# Patient Record
Sex: FEMALE | Race: WHITE | URBAN - METROPOLITAN AREA
[De-identification: names, ages, dates, MRNs, and addresses within clinical notes are randomized per-mention and may not be internally consistent; named-entity substitution may affect disease eponyms.]

---

## 2017-11-10 ENCOUNTER — OFFICE VISIT (OUTPATIENT)
Dept: FAMILY MEDICINE | Facility: CLINIC | Age: 42
End: 2017-11-10
Payer: MEDICAID

## 2017-11-10 ENCOUNTER — HEALTH MAINTENANCE LETTER (OUTPATIENT)
Age: 42
End: 2017-11-10

## 2017-11-10 ENCOUNTER — TELEPHONE (OUTPATIENT)
Dept: DERMATOLOGY | Facility: CLINIC | Age: 42
End: 2017-11-10

## 2017-11-10 VITALS
HEART RATE: 82 BPM | TEMPERATURE: 98 F | BODY MASS INDEX: 18.23 KG/M2 | SYSTOLIC BLOOD PRESSURE: 109 MMHG | WEIGHT: 102.9 LBS | DIASTOLIC BLOOD PRESSURE: 82 MMHG | HEIGHT: 63 IN

## 2017-11-10 DIAGNOSIS — G89.4 CHRONIC PAIN SYNDROME: Primary | ICD-10-CM

## 2017-11-10 DIAGNOSIS — L88 PYODERMA GANGRENOSA (H): ICD-10-CM

## 2017-11-10 DIAGNOSIS — T88.1XXA GUILLAIN-BARRE SYNDROME FOLLOWING VACCINATION (H): ICD-10-CM

## 2017-11-10 DIAGNOSIS — K52.9 COLITIS: ICD-10-CM

## 2017-11-10 DIAGNOSIS — F41.9 ANXIETY: ICD-10-CM

## 2017-11-10 DIAGNOSIS — G61.0 GUILLAIN-BARRE SYNDROME FOLLOWING VACCINATION (H): ICD-10-CM

## 2017-11-10 DIAGNOSIS — Z71.89 ENCOUNTER FOR OSTOMY CARE EDUCATION: ICD-10-CM

## 2017-11-10 DIAGNOSIS — G47.00 INSOMNIA, UNSPECIFIED TYPE: ICD-10-CM

## 2017-11-10 PROCEDURE — 99203 OFFICE O/P NEW LOW 30 MIN: CPT | Performed by: NURSE PRACTITIONER

## 2017-11-10 RX ORDER — TRAZODONE HYDROCHLORIDE 50 MG/1
150 TABLET, FILM COATED ORAL AT BEDTIME
Qty: 90 TABLET | Refills: 2 | Status: SHIPPED | OUTPATIENT
Start: 2017-11-10 | End: 2018-02-08

## 2017-11-10 RX ORDER — GABAPENTIN 300 MG/1
1 CAPSULE ORAL
COMMUNITY
Start: 2017-05-08 | End: 2017-11-10

## 2017-11-10 RX ORDER — GABAPENTIN 600 MG/1
1200 TABLET ORAL 3 TIMES DAILY
Qty: 180 TABLET | Refills: 1 | Status: SHIPPED | OUTPATIENT
Start: 2017-11-10 | End: 2018-01-31

## 2017-11-10 RX ORDER — TRAMADOL HYDROCHLORIDE 50 MG/1
50 TABLET ORAL EVERY 6 HOURS PRN
Qty: 90 TABLET | Refills: 0 | Status: SHIPPED | OUTPATIENT
Start: 2017-11-10 | End: 2017-12-18

## 2017-11-10 RX ORDER — SERTRALINE HYDROCHLORIDE 100 MG/1
100 TABLET, FILM COATED ORAL DAILY
Qty: 30 TABLET | Refills: 2 | Status: SHIPPED | OUTPATIENT
Start: 2017-11-10 | End: 2018-02-05

## 2017-11-10 NOTE — TELEPHONE ENCOUNTER
Message left to return call. I reviewed patient's chart and I think she should be worked in- Dr. Belle has a hold right now on Tuesday 11-14-17 at 2:30 pm, I would offer to patient if she calls back (put her in that spot if it is available. Penny Souza RN

## 2017-11-10 NOTE — PROGRESS NOTES
SUBJECTIVE:   Yuni Lam is a 42 year old female who presents to clinic today for the following health issues:would like to establish care. The patient has complicated health history. 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 She was following provider in [place], but recently moved to [place] to live with her mother as she could not afford living on her own anymore due to financial problems, she is currently unemployed. She was following wound clinic in [place], she was diagnosed with pyoderma gangrenosa, was treated with multiple antibiotics, topical steroids and also received steroid injections around stoma site which patient states made her pain and ulcers worse. She needs to establish care with new GI doctor and wound clinic.   For chronic pain she is currently taking Gabapentin 1200 mg 3 times daily and Tramadol 50 mg 3 times daily as needed. I did review [facility] medication monitoring program and there were no significant red flags.She received 6 opioid prescriptions for small amounts (30-40 tablets) since April 2017. She is looking for PCP who can help her with her chronic pain. She reports that she is currently taking Tramadol 50 mg 3 times daily and she does not take more than 3 tablets per day.   The patient also has history of anxiety, she was treated with Paxil in the past and states it did not help to control her symptoms, she is currently on Zoloft 100 mg daily and states her anxiety currently is under control.     Problem list and histories reviewed & adjusted, as indicated.  Additional history: as documented    Labs reviewed in EPIC    Reviewed and updated as needed this visit by clinical staff  Tobacco  Allergies  Meds  Med Hx  Surg Hx  Fam Hx  Soc Hx      Reviewed and updated as needed this visit by Provider         ROS:  Constitutional, HEENT, cardiovascular, pulmonary, gi and gu systems are negative, except as otherwise  "noted.      OBJECTIVE:   /82  Pulse 82  Temp 98  F (36.7  C) (Tympanic)  Ht 5' 3.25\" (1.607 m)  Wt 102 lb 14.4 oz (46.7 kg)  BMI 18.08 kg/m2  Body mass index is 18.08 kg/(m^2).  GENERAL: healthy, alert and no distress  RESP: lungs clear to auscultation - no rales, rhonchi or wheezes  CV: regular rate and rhythm, normal S1 S2, no S3 or S4, no murmur, click or rub, no peripheral edema and peripheral pulses strong  ABDOMEN: soft, nontender and bowel sounds normal  SKIN: ulcer near stoma measuring 2 by 2.7 cm, see image below.       PSYCH: mentation appears normal, affect normal/bright    Diagnostic Test Results:  none     ASSESSMENT/PLAN:     1. 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  -provided with one month of Tramadol for up to 3 times daily use   - traMADol (ULTRAM) 50 MG tablet; Take 1 tablet (50 mg) by mouth every 6 hours as needed for pain maximum 3-4 tablet(s) per day  Dispense: 90 tablet; Refill: 0  - GASTROENTEROLOGY ADULT REF CONSULT ONLY  - gabapentin (NEURONTIN) 600 MG tablet; Take 2 tablets (1,200 mg) by mouth 3 times daily  Dispense: 180 tablet; Refill: 1  -follow up in 1 month     2. Pyoderma gangrenosa    - WOUND CARE REFERRAL  - GASTROENTEROLOGY ADULT REF CONSULT ONLY  -dermatology consult, patient will see Dr. Belle    3. Encounter for ostomy care education    - WOUND CARE REFERRAL  - GASTROENTEROLOGY ADULT REF CONSULT ONLY    4. Colitis  -currently stable  - GASTROENTEROLOGY ADULT REF CONSULT ONLY    5. Insomnia, unspecified type  - traZODone (DESYREL) 50 MG tablet; Take 3 tablets (150 mg) by mouth At Bedtime  Dispense: 90 tablet; Refill: 2    6. Anxiety  -currently stable, well controlled   - sertraline (ZOLOFT) 100 MG tablet; Take 1 tablet (100 mg) by mouth daily  Dispense: 30 tablet; Refill: 2    7. 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

## 2017-11-10 NOTE — TELEPHONE ENCOUNTER
Pt called regarding an appointment with Dr. Belle. Apparently pt would not be able to be scheduled until 1/2/18 and would like to know what she should do given her recent diagnosis ...deep ulcers around the stomach. Please advise.    [facility]  Clinic Station [place]

## 2017-11-10 NOTE — PATIENT INSTRUCTIONS
Tramadol 1 tablet 3 times daily as needed, limit to 3 tablets daily, follow up in 1 month    GI clinic    Wound Clinic    Currently there is no infection    Let me know if symptoms get worse before you see wound clinic  I recommend to use stoma powder  Continue with current wound care as advised by previous wound RN

## 2017-11-10 NOTE — MR AVS SNAPSHOT
After Visit Summary   11/10/2017    Yuni Lam    MRN: [id number]           Patient Information     Date Of Birth          1975        Visit Information        Provider Department      11/10/2017 1:00 PM Jonelle Pelayo APRN [facility]        Today's Diagnoses     Chronic pain syndrome    -  1    Pyoderma gangrenosa        Encounter for ostomy care education        Colitis          Care Instructions    Tramadol 1 tablet 3 times daily as needed, try to limit to 3 tablets daily, follow up in 1 month    GI clinic    Wound Clinic    Currently there is no infection    Let me know if symptoms get worse before you see wound clinic  I recommend to use stoma powder  Continue with current wound care as advised by previous wound RN               Follow-ups after your visit        Additional Services     GASTROENTEROLOGY ADULT REF CONSULT ONLY       Preferred Location: [facility] [phone], [facility] [phone] and [place] [facility] [phone]      Please be aware that coverage of these services is subject to the terms and limitations of your health insurance plan.  Call member services at your health plan with any benefit or coverage questions.  Any procedures must be performed at a [facility] facility OR coordinated by your clinic's referral office.    Please bring the following with you to your appointment:    (1) Any X-Rays, CTs or MRIs which have been performed.  Contact the facility where they were done to arrange for  prior to your scheduled appointment.    (2) List of current medications   (3) This referral request   (4) Any documents/labs given to you for this referral            WOUND CARE REFERRAL       Your provider has referred you to: [facility]: [facility] (Wound/Enterostomal Therapy Nurse) [phone]   [email or web address]   To schedule an appointment, please contact the Wound  Care/Enterostomal Therapy Nurse at [phone] at [facility].      Reason for referral: Ostomy     Please be aware that coverage of these services is subject to the terms and limitations of your health insurance plan.  Call member services at your health plan with any benefit or coverage questions.      Please bring the following with you to your appointment:    (1) Any X-Rays, CTs or MRIs which have been performed.  Contact the facility where they were done to arrange for  prior to your scheduled appointment.    (2) List of current medications   (3) This referral request   (4) Any documents/labs given to you for this referral                  Your next 10 appointments already scheduled     Nov 13, 2017  1:00 PM CST   Office Visit with Jovanni Ernst MD   [facility]    [address] [address] [phone]           Bring a current list of meds and any records pertaining to this visit. For Physicals, please bring immunization records and any forms needing to be filled out. Please arrive 10 minutes early to complete paperwork.            Jan 02, 2018  1:30 PM CST   New Visit with Alejandro Belle MD   [facility] ([facility]    [address] [address] [phone]              Who to contact     If you have questions or need follow up information about today's clinic visit or your schedule please contact [facility] directly at [phone].  Normal or non-critical lab and imaging results will be communicated to you by MyChart, letter or phone within 4 business days after the clinic has received the results. If you do not hear from us within 7 days, please contact the clinic through MyChart or phone. If you have a critical or abnormal lab result, we will notify you by phone as soon as possible.  Submit refill requests through [employer] or call  "your pharmacy and they will forward the refill request to us. Please allow 3 business days for your refill to be completed.          Additional Information About Your Visit        MyChart Information     [facility] lets you send messages to your doctor, view your test results, renew your prescriptions, schedule appointments and more. To sign up, go to [email or web address] . Click on \"Log in\" on the left side of the screen, which will take you to the Welcome page. Then click on \"Sign up Now\" on the right side of the page.     You will be asked to enter the access code listed below, as well as some personal information. Please follow the directions to create your username and password.     Your access code is: [email or web address]  Expires: 2018  2:08 PM     Your access code will  in 90 days. If you need help or a new code, please call your [facility] clinic or [phone].        Care EveryWhere ID     This is your Care EveryWhere ID. This could be used by other organizations to access your [facility] medical records  [id number]        Your Vitals Were     Pulse Temperature Height BMI (Body Mass Index)          82 98  F (36.7  C) (Tympanic) 5' 3.25\" (1.607 m) 18.08 kg/m2         Blood Pressure from Last 3 Encounters:   11/10/17 109/82   10/13/10 130/94   02/01/10 130/90    Weight from Last 3 Encounters:   11/10/17 102 lb 14.4 oz (46.7 kg)   10/13/10 117 lb (53.1 kg)   02/01/10 121 lb (54.9 kg)              We Performed the Following     GASTROENTEROLOGY ADULT REF CONSULT ONLY     WOUND CARE REFERRAL          Today's Medication Changes          These changes are accurate as of: 11/10/17  2:08 PM.  If you have any questions, ask your nurse or doctor.               These medicines have changed or have updated prescriptions.        Dose/Directions    * TRAMADOL HCL PO   This may have changed:  Another medication with the same name was added. Make sure you understand how and when to take each.   Changed by:  Pierce, " MICAELA Brown CNP        Dose:  25 mg   Take 25 mg by mouth every 6 hours as needed for moderate to severe pain   Refills:  0       * traMADol 50 MG tablet   Commonly known as:  ULTRAM   This may have changed:  You were already taking a medication with the same name, and this prescription was added. Make sure you understand how and when to take each.   Used for:  Chronic pain syndrome   Changed by:  Pierce, Jonelle Lindsay APRN CNP        Dose:  50 mg   Take 1 tablet (50 mg) by mouth every 6 hours as needed for pain maximum 3-4 tablet(s) per day   Quantity:  90 tablet   Refills:  0       * Notice:  This list has 2 medication(s) that are the same as other medications prescribed for you. Read the directions carefully, and ask your doctor or other care provider to review them with you.         Where to get your medicines      Some of these will need a paper prescription and others can be bought over the counter.  Ask your nurse if you have questions.     Bring a paper prescription for each of these medications     traMADol 50 MG tablet                Primary Care Provider Office Phone # Fax #    Matthias Al PA-C [phone] [address] [place] [address]        Equal Access to Services     [facility]: [place], waclaudioda carlos, qaybta kaalmapooja grider, maribel ngo. So [facility] [phone].    ATENCIÓN: Si habla español, tiene a stephen disposición servicios gratuitos de asistencia lingüística. Andrew al [phone].    We comply with applicable federal civil rights laws and [place] laws. We do not discriminate on the basis of race, color, national origin, age, disability, sex, sexual orientation, or gender identity.            Thank you!     Thank you for choosing [facility]  for your care. Our goal is always to provide you with excellent care. Hearing back from our patients is one way we can continue to improve our  services. Please take a few minutes to complete the written survey that you may receive in the mail after your visit with us. Thank you!             Your Updated Medication List - Protect others around you: Learn how to safely use, store and throw away your medicines at www.disposemymeds.org.          This list is accurate as of: 11/10/17  2:08 PM.  Always use your most recent med list.                   Brand Name Dispense Instructions for use Diagnosis    CAMPRAL PO      Take by mouth 3 times daily        FUROSEMIDE PO      Take 20 mg by mouth daily        GABAPENTIN PO      Take 1,200 mg by mouth 3 times daily        IRON SUPPLEMENT PO      Take 325 mg by mouth daily        PANTOPRAZOLE SODIUM PO      Take 40 mg by mouth daily        PARoxetine 20 MG tablet    PAXIL    30 tablet    Take 1 tablet by mouth every morning.    Panic attacks       sertraline 50 MG tablet    ZOLOFT    30 tablet    Take 1 tablet by mouth daily.    Depressive disorder, not elsewhere classified       * TRAMADOL HCL PO      Take 25 mg by mouth every 6 hours as needed for moderate to severe pain        * traMADol 50 MG tablet    ULTRAM    90 tablet    Take 1 tablet (50 mg) by mouth every 6 hours as needed for pain maximum 3-4 tablet(s) per day    Chronic pain syndrome       traZODone 100 MG tablet    DESYREL    30 tablet    Take 1 tablet by mouth At Bedtime.    Panic attacks       VITAMIN D (CHOLECALCIFEROL) PO      Take 1,000 Units by mouth daily        * Notice:  This list has 2 medication(s) that are the same as other medications prescribed for you. Read the directions carefully, and ask your doctor or other care provider to review them with you.

## 2017-11-10 NOTE — NURSING NOTE
"Chief Complaint   Patient presents with     other     Patient has an illestomy bag and has ulcers around surrounding the stoma      Establish Care     Has been going to the wound clinic in [place], is here for a second opinion today        Initial /82  Pulse 82  Temp 98  F (36.7  C) (Tympanic)  Ht 5' 3.25\" (1.607 m)  Wt 102 lb 14.4 oz (46.7 kg)  BMI 18.08 kg/m2 Estimated body mass index is 18.08 kg/(m^2) as calculated from the following:    Height as of this encounter: 5' 3.25\" (1.607 m).    Weight as of this encounter: 102 lb 14.4 oz (46.7 kg).  Medication Reconciliation: complete  "

## 2017-11-12 PROBLEM — T88.1XXA: Status: ACTIVE | Noted: 2017-11-12

## 2017-11-12 PROBLEM — Z90.710 HISTORY OF HYSTERECTOMY: Status: ACTIVE | Noted: 2017-11-12

## 2017-11-12 PROBLEM — F41.9 ANXIETY: Status: ACTIVE | Noted: 2017-11-12

## 2017-11-12 PROBLEM — Z90.49 HISTORY OF TOTAL COLECTOMY: Status: ACTIVE | Noted: 2017-11-12

## 2017-11-12 PROBLEM — G61.0: Status: ACTIVE | Noted: 2017-11-12

## 2017-11-12 ASSESSMENT — ANXIETY QUESTIONNAIRES
2. NOT BEING ABLE TO STOP OR CONTROL WORRYING: NEARLY EVERY DAY
IF YOU CHECKED OFF ANY PROBLEMS ON THIS QUESTIONNAIRE, HOW DIFFICULT HAVE THESE PROBLEMS MADE IT FOR YOU TO DO YOUR WORK, TAKE CARE OF THINGS AT HOME, OR GET ALONG WITH OTHER PEOPLE: SOMEWHAT DIFFICULT
5. BEING SO RESTLESS THAT IT IS HARD TO SIT STILL: NOT AT ALL
6. BECOMING EASILY ANNOYED OR IRRITABLE: NOT AT ALL
7. FEELING AFRAID AS IF SOMETHING AWFUL MIGHT HAPPEN: SEVERAL DAYS
4. TROUBLE RELAXING: SEVERAL DAYS
3. WORRYING TOO MUCH ABOUT DIFFERENT THINGS: NEARLY EVERY DAY
1. FEELING NERVOUS, ANXIOUS, OR ON EDGE: SEVERAL DAYS
GAD7 TOTAL SCORE: 9

## 2017-11-12 ASSESSMENT — PATIENT HEALTH QUESTIONNAIRE - PHQ9: SUM OF ALL RESPONSES TO PHQ QUESTIONS 1-9: 18

## 2017-11-13 ENCOUNTER — HOSPITAL ENCOUNTER (OUTPATIENT)
Dept: WOUND CARE | Facility: CLINIC | Age: 42
Discharge: HOME | End: 2017-11-13
Attending: SURGERY | Admitting: SURGERY
Payer: MEDICAID

## 2017-11-13 PROCEDURE — 97602 WOUND(S) CARE NON-SELECTIVE: CPT

## 2017-11-13 PROCEDURE — 99214 OFFICE O/P EST MOD 30 MIN: CPT | Mod: 25

## 2017-11-13 ASSESSMENT — ANXIETY QUESTIONNAIRES: GAD7 TOTAL SCORE: 9

## 2017-11-13 NOTE — IP AVS SNAPSHOT
MRN:[id number]                      After Visit Summary   11/13/2017    Yuni Lam    MRN: [id number]           Visit Information        Provider Department      11/13/2017  2:00 PM Sky Alegre Nurse - [facility] Wound Ostomy           Review of your medicines      UNREVIEWED medicines. Ask your doctor about these medicines        Dose / Directions    CAMPRAL PO        Take by mouth 3 times daily   Refills:  0       FUROSEMIDE PO        Dose:  20 mg   Take 20 mg by mouth daily   Refills:  0       gabapentin 600 MG tablet   Commonly known as:  NEURONTIN   Used for:  Chronic pain syndrome        Dose:  1200 mg   Take 2 tablets (1,200 mg) by mouth 3 times daily   Quantity:  180 tablet   Refills:  1       IRON SUPPLEMENT PO        Dose:  325 mg   Take 325 mg by mouth daily   Refills:  0       PANTOPRAZOLE SODIUM PO        Dose:  40 mg   Take 40 mg by mouth daily   Refills:  0       sertraline 100 MG tablet   Commonly known as:  ZOLOFT   Used for:  Anxiety        Dose:  100 mg   Take 1 tablet (100 mg) by mouth daily   Quantity:  30 tablet   Refills:  2       traMADol 50 MG tablet   Commonly known as:  ULTRAM   Used for:  Chronic pain syndrome        Dose:  50 mg   Take 1 tablet (50 mg) by mouth every 6 hours as needed for pain maximum 3-4 tablet(s) per day   Quantity:  90 tablet   Refills:  0       traZODone 50 MG tablet   Commonly known as:  DESYREL   Used for:  Insomnia, unspecified type        Dose:  150 mg   Take 3 tablets (150 mg) by mouth At Bedtime   Quantity:  90 tablet   Refills:  2       VITAMIN D (CHOLECALCIFEROL) PO        Dose:  1000 Units   Take 1,000 Units by mouth daily   Refills:  0                Protect others around you: Learn how to safely use, store and throw away your medicines at www.disposemymeds.org.         Follow-ups after your visit        Your next 10 appointments already scheduled     Nov 14, 2017  2:30 PM CST   [facility] Visit with Alejandro Belle MD    [facility]    [address]  [facility] [address]   [phone]            Nov 17, 2017  2:45 PM CST   Office Visit with Et Nurse - [facility] Wound Ostomy ([facility])    [address] [address]   [phone]           Bring a current list of meds and any records pertaining to this visit. For Physicals, please bring immunization records and any forms needing to be filled out. Please arrive 10 minutes early to complete paperwork.            Nov 21, 2017  2:00 PM CST   Office Visit with Et Nurse - [facility] Wound Ostomy ([facility])    [address] [address]   [phone]           Bring a current list of meds and any records pertaining to this visit. For Physicals, please bring immunization records and any forms needing to be filled out. Please arrive 10 minutes early to complete paperwork.               Care Instructions        Further instructions from your care team       Leave pouch in place until appointment tomorrow if able, I am going to try to check in with you tomorrow at Dr. Belle's office.      To change:  1.) Irrigate the wound with saline and wick out the extra saline and dry as able  2.)  Cut 2 layers of Aquacel and place in wound trying to keep it a couple millimeters away from the stoma.    3.) Cover over the Aquacel with a piece of Coloplast barrier sheet to hold it OR try the large adapt ring molded and pressed over the area  4.) Cut out pouch opening to match the oval convex barrier ring then press barrier ring onto pouch  5.) Place pouch centered over stoma then attach belt snuggly    If this leaks may try the same as above with YOUR pouches cut with oval opening though DO NOT use the convex barrier ring.    Destini Knott RN, CWOCN         Additional Information About Your Visit        [employer] Information     [employer] gives you secure access to  your electronic health record. If you see a primary care provider, you can also send messages to your care team and make appointments. If you have questions, please call your primary care clinic.  If you do not have a primary care provider, please call [phone] and they will assist you.        Care EveryWhere ID     This is your Care EveryWhere ID. This could be used by other organizations to access your [facility] medical records  [id number]         Primary Care Provider Office Phone # Fax #    Matthias Al PA-C [phone]      Equal Access to Services     [facility]: [place], waaxpooja gonzalez, qajose kadenis grider, maribel huertas . So [facility] [phone].    ATENCIÓN: Si habla español, tiene a stephen disposición servicios gratuitos de asistencia lingüística. [facility] [phone].    We comply with applicable federal civil rights laws and [place] laws. We do not discriminate on the basis of race, color, national origin, age, disability, sex, sexual orientation, or gender identity.            Thank you!     Thank you for choosing [facility] for your care. Our goal is always to provide you with excellent care. Hearing back from our patients is one way we can continue to improve our services. Please take a few minutes to complete the written survey that you may receive in the mail after you visit with us. Thank you!             Medication List: This is a list of all your medications and when to take them. Check marks below indicate your daily home schedule. Keep this list as a reference.      Medications           Morning Afternoon Evening Bedtime As Needed    CAMPRAL PO   Take by mouth 3 times daily                                FUROSEMIDE PO   Take 20 mg by mouth daily                                gabapentin 600 MG tablet   Commonly known as:  NEURONTIN   Take 2 tablets (1,200 mg) by mouth 3 times daily                                 IRON SUPPLEMENT PO   Take 325 mg by mouth daily                                PANTOPRAZOLE SODIUM PO   Take 40 mg by mouth daily                                sertraline 100 MG tablet   Commonly known as:  ZOLOFT   Take 1 tablet (100 mg) by mouth daily                                traMADol 50 MG tablet   Commonly known as:  ULTRAM   Take 1 tablet (50 mg) by mouth every 6 hours as needed for pain maximum 3-4 tablet(s) per day                                traZODone 50 MG tablet   Commonly known as:  DESYREL   Take 3 tablets (150 mg) by mouth At Bedtime                                VITAMIN D (CHOLECALCIFEROL) PO   Take 1,000 Units by mouth daily

## 2017-11-13 NOTE — PROGRESS NOTES
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  Patient was referred by MICAELA Roy. She is new to the area and was previously seen by the [facility].      HPI:  Pt as diagnosed with UC and had colectomy in '09 with temporary ileostomy then a J Pouch.  She had issues with the J Pouch for 7 years eventually deciding to have a take down of this with ileostomy this past July.  Per pt, she has has pouching issues since this start but this eventually was under control and down to a 2-3 day wear time until more recently when she developed PG about 4 weeks ago.  Pt is not sure if a biopsy was done but states they did a \"scraping\" initially and an oral steriod for a week plus local steriod injections then it got worse. She is now needing to replace pouches 2-3 times per day.  Pt is looking to establish care here as due to moving will no longer be going to [facility].  She is seeing Dr. Belle in dermatology tomorrow for treatment as well.      Co-morbidities include anxiety.  She also states she saw a GI specialist in [place] on November 1st due to \"problems with my bottom\" and was scoped.  Concern was for Crohn's though this provider did not think this was the case and per pt he thought is was more of a \"diverson colitis\".      Objective: Patient's ileostomy is located in the right upper quadrant. Patient is wearing a ConvaTec Esteem Flex Convex pouch [id number] cut to fit up to 35mm with belt.  Has tried rings but seemed possibly worse with rings.  Has been using hydrofera blue over the wound but having issues with this slipping out and into the pouch.  This was just placed this morning and with removal there is significant absorption of stool and likely wound drainage up to 1.5-2cm out from stoma.  Do note that though stoma is oval with below noted dimensions pt is cutting a round opening that does not " adequately fit her stoma.    Output: Loose stool, varies from more watery to thicker.  Taking a fiber wafer and is also on iron which thickens  Stoma color: pink  Viability: good  Os/Lumen: at apex  Tone: good  Profile: minimal and when sitting and especially standing does draw into soft abd more  Shape: oval  MCJ: attached, some denudement along MCJ inferiorly, stoma is flush at MCJ and protrudes some at os  Stoma Size: [id number]  Peristomal Skin: there is a large full thickness wound superior and right up to stoma with no skin bridge to separate  Abd: Pt has a very soft abd around the stoma despite being very thin and there is a lot of creasing in area with sitting.    Pain: intense pain with touch all around stoma but especially at wound and near MC junction at 7-9:00     Wound #1 Pyoderma wound above stoma  Specific Dimensions (length x width x depth, in cm) :  1.8cm x 2.6cmx up to 0.4cm estimated  Tunneling: none noted  Undermining: 3-4:00, about 2-3mm though hard to tell as does not tolerate much touch to assess  Wound Base: 90% slough, gray green and 10% pink  Palpation of the wound bed: firm though barely able to palpate due to pain  Periwound Skin: some uneven very mild purplish discoloration noted in some spots  Temperature: WNL  Drainage: unknown, some bloody noted on back of ostomy pouch   Odor: no  Pain:  intense          Assessment:  Ileostomy with already complicated pouching due to soft creasing abd, oval stoma with minimal profile and now extremely complicated due to large full thickness necrotic and painful PG ulceration next to stoma    Plan:  Needs medical management for PG, to see Dr. Belle tomorrow.       Stoma requires convexity for proper seal though convexity likely contributing to discomfort.  Wound is necrotic and will need autolytic debridement since sharp debridement will exacerbate PG.       For today I tried Aquacel Ag to wound for absorption of drainage and autolytic debridement  followed by a piece of Coloplast Brava protective sheet to hold it in place.  Pt was then fit with a Shana cut to fit convex pouch #[id number] for softer convexity and dud add a Cam convex oval ring #[id number] to better fit stoma and try to create a better barrier between wound and stoma.  Belt applied.  This will need to be removed tomorrow for dermatology appointment, [facility] Nurse will try to check in with pt when she comes for appointment to assess this.  Pt was given supplies and instructions to duplicate this one more time if successful.  If not, alternative plan will be for her to use her pouch though with oval opening cut and skip the oval barrier ring otherwise following plan laid out below.      Pt Instructions Provided and Reviewed:  eave pouch in place until appointment tomorrow if able, I am going to try to check in with you tomorrow at Dr. Belle's office.      To change:  1.) Irrigate the wound with saline and wick out the extra saline and dry as able  2.)  Cut 2 layers of Aquacel and place in wound trying to keep it a couple millimeters away from the stoma.    3.) Cover over the Aquacel with a piece of Coloplast barrier sheet to hold it OR try the large adapt ring molded and pressed over the area  4.) Cut out pouch opening to match the oval convex barrier ring then press barrier ring onto pouch  5.) Place pouch centered over stoma then attach belt snuggly    If this leaks may try the same as above with YOUR pouches cut with oval opening though DO NOT use the convex barrier ring.      Verbal, written, & demonstrative education provided.    Face to face time approximately 60 minutes.  Destini Knott RN, CWOCN     [phone]

## 2017-11-13 NOTE — DISCHARGE INSTRUCTIONS
Leave pouch in place until appointment tomorrow if able, I am going to try to check in with you tomorrow at Dr. Belle's office.      To change:  1.) Irrigate the wound with saline and wick out the extra saline and dry as able  2.)  Cut 2 layers of Aquacel and place in wound trying to keep it a couple millimeters away from the stoma.    3.) Cover over the Aquacel with a piece of Coloplast barrier sheet to hold it OR try the large adapt ring molded and pressed over the area  4.) Cut out pouch opening to match the oval convex barrier ring then press barrier ring onto pouch  5.) Place pouch centered over stoma then attach belt snuggly    If this leaks may try the same as above with YOUR pouches cut with oval opening though DO NOT use the convex barrier ring.    Destini Knott RN, CWOCN

## 2017-11-14 ENCOUNTER — OFFICE VISIT (OUTPATIENT)
Dept: DERMATOLOGY | Facility: CLINIC | Age: 42
End: 2017-11-14
Payer: MEDICAID

## 2017-11-14 VITALS — HEIGHT: 63 IN | SYSTOLIC BLOOD PRESSURE: 109 MMHG | HEART RATE: 83 BPM | DIASTOLIC BLOOD PRESSURE: 75 MMHG

## 2017-11-14 DIAGNOSIS — L88 PYODERMA GANGRENOSUM (H): Primary | ICD-10-CM

## 2017-11-14 PROCEDURE — 99203 OFFICE O/P NEW LOW 30 MIN: CPT | Performed by: DERMATOLOGY

## 2017-11-14 RX ORDER — BETAMETHASONE DIPROPIONATE 0.5 MG/G
CREAM TOPICAL
Qty: 100 G | Refills: 3 | Status: SHIPPED | OUTPATIENT
Start: 2017-11-14

## 2017-11-14 NOTE — MR AVS SNAPSHOT
After Visit Summary   11/14/2017    Yuni Lam    MRN: [id number]           Patient Information     Date Of Birth          1975        Visit Information        Provider Department      11/14/2017 2:30 PM Alejandro Belle MD [facility]        Today's Diagnoses     Pyoderma gangrenosum    -  1       Follow-ups after your visit        Your next 10 appointments already scheduled     Nov 17, 2017  2:45 PM CST   Office Visit with Et Nurse - [facility]    [address] [address]   [phone]           Bring a current list of meds and any records pertaining to this visit. For Physicals, please bring immunization records and any forms needing to be filled out. Please arrive 10 minutes early to complete paperwork.            Nov 21, 2017  2:00 PM CST   Office Visit with Et Nurse - [facility]    [address] [address] [phone]           Bring a current list of meds and any records pertaining to this visit. For Physicals, please bring immunization records and any forms needing to be filled out. Please arrive 10 minutes early to complete paperwork.              Who to contact     If you have questions or need follow up information about today's clinic visit or your schedule please contact [facility] directly at [phone].  Normal or non-critical lab and imaging results will be communicated to you by MyChart, letter or phone within 4 business days after the clinic has received the results. If you do not hear from us within 7 days, please contact the clinic through MyChart or phone. If you have a critical or abnormal lab result, we will notify you by phone as soon as possible.  Submit refill requests through [employer] or call your pharmacy and they will forward the refill request to us. Please allow 3 business days  "for your refill to be completed.          Additional Information About Your Visit        MyChart Information     [facility] gives you secure access to your electronic health record. If you see a primary care provider, you can also send messages to your care team and make appointments. If you have questions, please call your primary care clinic.  If you do not have a primary care provider, please call [phone] and they will assist you.        Care EveryWhere ID     This is your Care EveryWhere ID. This could be used by other organizations to access your [facility] medical records  [id number]        Your Vitals Were     Pulse Height                83 1.607 m (5' 3.25\")           Blood Pressure from Last 3 Encounters:   11/14/17 109/75   11/10/17 109/82   10/13/10 130/94    Weight from Last 3 Encounters:   11/10/17 46.7 kg (102 lb 14.4 oz)   10/13/10 53.1 kg (117 lb)   02/01/10 54.9 kg (121 lb)              We Performed the Following     Hepatitis B Surface Antibody     Hepatitis C antibody     M Tuberculosis by Quantiferon          Today's Medication Changes          These changes are accurate as of: 11/14/17  2:53 PM.  If you have any questions, ask your nurse or doctor.               Start taking these medicines.        Dose/Directions    adalimumab 40 MG/0.8ML prefilled syringe kit   Commonly known as:  HUMIRA   Used for:  Pyoderma gangrenosum        80 mg Day 1 and Day 2; 80 mg on Day 15; (Day 29), 40 mg every other week   Quantity:  6 kit   Refills:  3       augmented betamethasone dipropionate 0.05 % cream   Commonly known as:  DIPROLENE-AF   Used for:  Pyoderma gangrenosum        Apply sparingly to affected area twice daily as needed.  Do not apply to face.   Quantity:  100 g   Refills:  3            Where to get your medicines      These medications were sent to [facility] - [place], [place] - [facility] [address]  [address] , [facility] [address]    Hours:  Mon-Fri " 8:30am-5:00pm Toll Free [phone] Phone:  [phone]     adalimumab 40 MG/0.8ML prefilled syringe kit    augmented betamethasone dipropionate 0.05 % cream                Primary Care Provider Office Phone # Fax #    [facility] [phone]       [address] [place] [address]        Equal Access to Services     SALTY AHUMADA : Hadii aad ku hadasho Soomaali, waaxda luqadaha, qaybta kaalmada adeegyada, waxay campbellin hayfernien dann haywoodjenniecamilo huertas . So [facility] [phone].    ATENCIÓN: Si veronica salomon, tiene a stephen disposición servicios gratuitos de asistencia lingüística. [facility] [phone].    We comply with applicable federal civil rights laws and [place] laws. We do not discriminate on the basis of race, color, national origin, age, disability, sex, sexual orientation, or gender identity.            Thank you!     Thank you for choosing [facility]  for your care. Our goal is always to provide you with excellent care. Hearing back from our patients is one way we can continue to improve our services. Please take a few minutes to complete the written survey that you may receive in the mail after your visit with us. Thank you!             Your Updated Medication List - Protect others around you: Learn how to safely use, store and throw away your medicines at www.disposemymeds.org.          This list is accurate as of: 11/14/17  2:53 PM.  Always use your most recent med list.                   Brand Name Dispense Instructions for use Diagnosis    adalimumab 40 MG/0.8ML prefilled syringe kit    HUMIRA    6 kit    80 mg Day 1 and Day 2; 80 mg on Day 15; (Day 29), 40 mg every other week    Pyoderma gangrenosum       augmented betamethasone dipropionate 0.05 % cream    DIPROLENE-AF    100 g    Apply sparingly to affected area twice daily as needed.  Do not apply to face.    Pyoderma gangrenosum       CAMPRAL PO      Take by mouth 3 times daily        FUROSEMIDE PO      Take  20 mg by mouth daily        gabapentin 600 MG tablet    NEURONTIN    180 tablet    Take 2 tablets (1,200 mg) by mouth 3 times daily    Chronic pain syndrome       IRON SUPPLEMENT PO      Take 325 mg by mouth daily        PANTOPRAZOLE SODIUM PO      Take 40 mg by mouth daily        sertraline 100 MG tablet    ZOLOFT    30 tablet    Take 1 tablet (100 mg) by mouth daily    Anxiety       traMADol 50 MG tablet    ULTRAM    90 tablet    Take 1 tablet (50 mg) by mouth every 6 hours as needed for pain maximum 3-4 tablet(s) per day    Chronic pain syndrome       traZODone 50 MG tablet    DESYREL    90 tablet    Take 3 tablets (150 mg) by mouth At Bedtime    Insomnia, unspecified type       VITAMIN D (CHOLECALCIFEROL) PO      Take 1,000 Units by mouth daily

## 2017-11-14 NOTE — LETTER
2017         RE: Yuni Lam  [address] [place] [address]        Dear Colleague,    Thank you for referring your patient, Yuni Lam, to the [facility]. Please see a copy of my visit note below.    Yuni Lam is a 42 year old year old female patient here today for peristomal PG.  This was debrided and got much worse.  No bx but Destini from wound care states this has doubled in size.  She has a hxof either UC or crohns there is no diagnosis yet.  This is very painful.  Associated symptoms: none.  Patient has no other skin complaints today.  Remainder of the HPI, Meds, PMH, Allergies, FH, and SH was reviewed in chart.      Past Medical History:   Diagnosis Date     Abnormal Papanicolaou smear of cervix and cervical HPV     LEEP     Allergic rhinitis due to pollen      Depressive disorder, not elsewhere classified     Depression (non-psychotic)     Ulcerative colitis        Past Surgical History:   Procedure Laterality Date     C ANESTH, SECTION       C TOTAL ABDOM HYSTERECTOMY      Hysterectomy, Total Abdominal- prolapsed uterus     COLECTOMY      Ulcerative colitis     HC REMOVAL OF OVARY/TUBE(S)      Salpingo-Oophorectomy, Bilateral        Family History   Problem Relation Age of Onset     Alcohol/Drug Father      Coronary Artery Disease Early Onset Father      CANCER Maternal Grandmother      lung     OSTEOPOROSIS Maternal Grandmother      CEREBROVASCULAR DISEASE Maternal Grandmother      DIABETES Sister      gestational     Emphysema Mother        Social History     Social History     Marital status: Legally      Spouse name: N/A     Number of children: N/A     Years of education: N/A     Occupational History     Not on file.     Social History Main Topics     Smoking status: Current Every Day Smoker     Packs/day: 0.50     Years: 15.00     Smokeless tobacco: Never Used     Alcohol use 6.0 oz/week     Drug use: No     Sexual  "activity: Yes     Partners: Male     Birth control/ protection: Surgical     Other Topics Concern     Not on file     Social History Narrative       Outpatient Encounter Prescriptions as of 11/14/2017   Medication Sig Dispense Refill     adalimumab (HUMIRA) 40 MG/0.8ML prefilled syringe kit 80 mg Day 1 and Day 2; 80 mg on Day 15; (Day 29), 40 mg every other week 6 kit 3     augmented betamethasone dipropionate (DIPROLENE-AF) 0.05 % cream Apply sparingly to affected area twice daily as needed.  Do not apply to face. 100 g 3     PANTOPRAZOLE SODIUM PO Take 40 mg by mouth daily       FUROSEMIDE PO Take 20 mg by mouth daily       VITAMIN D, CHOLECALCIFEROL, PO Take 1,000 Units by mouth daily       Ferrous Sulfate (IRON SUPPLEMENT PO) Take 325 mg by mouth daily       Acamprosate Calcium (CAMPRAL PO) Take by mouth 3 times daily       traMADol (ULTRAM) 50 MG tablet Take 1 tablet (50 mg) by mouth every 6 hours as needed for pain maximum 3-4 tablet(s) per day 90 tablet 0     traZODone (DESYREL) 50 MG tablet Take 3 tablets (150 mg) by mouth At Bedtime 90 tablet 2     sertraline (ZOLOFT) 100 MG tablet Take 1 tablet (100 mg) by mouth daily 30 tablet 2     gabapentin (NEURONTIN) 600 MG tablet Take 2 tablets (1,200 mg) by mouth 3 times daily 180 tablet 1     No facility-administered encounter medications on file as of 11/14/2017.              Review Of Systems  Skin: As above  Eyes: negative  Ears/Nose/Throat: negative  Respiratory: No shortness of breath, dyspnea on exertion, cough, or hemoptysis  Cardiovascular: negative  Gastrointestinal: negative  Genitourinary: negative  Musculoskeletal: negative  Neurologic: negative  Psychiatric: negative  Hematologic/Lymphatic/Immunologic: negative  Endocrine: negative      O:   NAD, WDWN, Alert & Oriented, Mood & Affect wnl, Vitals stable   Here today alone   /75  Pulse 83  Ht 1.607 m (5' 3.25\")   General appearance normal   Vitals stable   Alert, oriented and in no acute " distress     Peristomal punched out ulceration with significant undermined border      Eyes: Conjunctivae/lids:Normal     ENT: Lips, buccal mucosa, tongue: normal    MSK:Normal    Cardiovascular: peripheral edema none    Pulm: Breathing Normal    Neuro/Psych: Orientation:Normal; Mood/Affect:Normal      A/P:  1. Favor PG given clinhica and pain and hx of debridement worsening  Pathophysiology discussed with pateint and information provided   She does not want to be on any oral steroids.    She has tried one week of steorids but hates them given her IBD hx  She has tried ILT AC    Dapsone, CSA and Humira discussed with patient   Will start humira for acute crohns dosing to see if we can get covered  Go with topicals for now  Cont current wound care  Check tb, hep panel  Return to clinic 2 weeks  If cant get Humira covers will stat CSA risks of humira nad CSA discussed with patient       Again, thank you for allowing me to participate in the care of your patient.        Sincerely,        Alejandro Belle MD

## 2017-11-14 NOTE — PROGRESS NOTES
Yuni Lam is a 42 year old year old female patient here today for peristomal PG.  This was debrided and got much worse.  No bx but Destini from wound care states this has doubled in size.  She has a hxof either UC or crohns there is no diagnosis yet.  This is very painful.  Associated symptoms: none.  Patient has no other skin complaints today.  Remainder of the HPI, Meds, PMH, Allergies, FH, and SH was reviewed in chart.      Past Medical History:   Diagnosis Date     Abnormal Papanicolaou smear of cervix and cervical HPV     LEEP     Allergic rhinitis due to pollen      Depressive disorder, not elsewhere classified     Depression (non-psychotic)     Ulcerative colitis        Past Surgical History:   Procedure Laterality Date     C ANESTH, SECTION       C TOTAL ABDOM HYSTERECTOMY      Hysterectomy, Total Abdominal- prolapsed uterus     COLECTOMY      Ulcerative colitis     HC REMOVAL OF OVARY/TUBE(S)      Salpingo-Oophorectomy, Bilateral        Family History   Problem Relation Age of Onset     Alcohol/Drug Father      Coronary Artery Disease Early Onset Father      CANCER Maternal Grandmother      lung     OSTEOPOROSIS Maternal Grandmother      CEREBROVASCULAR DISEASE Maternal Grandmother      DIABETES Sister      gestational     Emphysema Mother        Social History     Social History     Marital status: Legally      Spouse name: N/A     Number of children: N/A     Years of education: N/A     Occupational History     Not on file.     Social History Main Topics     Smoking status: Current Every Day Smoker     Packs/day: 0.50     Years: 15.00     Smokeless tobacco: Never Used     Alcohol use 6.0 oz/week     Drug use: No     Sexual activity: Yes     Partners: Male     Birth control/ protection: Surgical     Other Topics Concern     Not on file     Social History Narrative       Outpatient Encounter Prescriptions as of 2017   Medication Sig Dispense Refill     adalimumab  "(HUMIRA) 40 MG/0.8ML prefilled syringe kit 80 mg Day 1 and Day 2; 80 mg on Day 15; (Day 29), 40 mg every other week 6 kit 3     augmented betamethasone dipropionate (DIPROLENE-AF) 0.05 % cream Apply sparingly to affected area twice daily as needed.  Do not apply to face. 100 g 3     PANTOPRAZOLE SODIUM PO Take 40 mg by mouth daily       FUROSEMIDE PO Take 20 mg by mouth daily       VITAMIN D, CHOLECALCIFEROL, PO Take 1,000 Units by mouth daily       Ferrous Sulfate (IRON SUPPLEMENT PO) Take 325 mg by mouth daily       Acamprosate Calcium (CAMPRAL PO) Take by mouth 3 times daily       traMADol (ULTRAM) 50 MG tablet Take 1 tablet (50 mg) by mouth every 6 hours as needed for pain maximum 3-4 tablet(s) per day 90 tablet 0     traZODone (DESYREL) 50 MG tablet Take 3 tablets (150 mg) by mouth At Bedtime 90 tablet 2     sertraline (ZOLOFT) 100 MG tablet Take 1 tablet (100 mg) by mouth daily 30 tablet 2     gabapentin (NEURONTIN) 600 MG tablet Take 2 tablets (1,200 mg) by mouth 3 times daily 180 tablet 1     No facility-administered encounter medications on file as of 11/14/2017.              Review Of Systems  Skin: As above  Eyes: negative  Ears/Nose/Throat: negative  Respiratory: No shortness of breath, dyspnea on exertion, cough, or hemoptysis  Cardiovascular: negative  Gastrointestinal: negative  Genitourinary: negative  Musculoskeletal: negative  Neurologic: negative  Psychiatric: negative  Hematologic/Lymphatic/Immunologic: negative  Endocrine: negative      O:   NAD, WDWN, Alert & Oriented, Mood & Affect wnl, Vitals stable   Here today alone   /75  Pulse 83  Ht 1.607 m (5' 3.25\")   General appearance normal   Vitals stable   Alert, oriented and in no acute distress     Peristomal punched out ulceration with significant undermined border      Eyes: Conjunctivae/lids:Normal     ENT: Lips, buccal mucosa, tongue: normal    MSK:Normal    Cardiovascular: peripheral edema none    Pulm: Breathing " Normal    Neuro/Psych: Orientation:Normal; Mood/Affect:Normal      A/P:  1. Favor PG given clinhica and pain and hx of debridement worsening  Pathophysiology discussed with pateint and information provided   She does not want to be on any oral steroids.    She has tried one week of steorids but hates them given her IBD hx  She has tried ILT AC    Dapsone, CSA and Humira discussed with patient   Will start humira for acute crohns dosing to see if we can get covered  Go with topicals for now  Cont current wound care  Check tb, hep panel  Return to clinic 2 weeks  If cant get Humira covers will stat CSA risks of humira nad CSA discussed with patient

## 2017-11-14 NOTE — NURSING NOTE
"Initial /75  Pulse 83  Ht 1.607 m (5' 3.25\") Estimated body mass index is 18.08 kg/(m^2) as calculated from the following:    Height as of 11/10/17: 1.607 m (5' 3.25\").    Weight as of 11/10/17: 46.7 kg (102 lb 14.4 oz). .      "

## 2017-11-16 ENCOUNTER — TELEPHONE (OUTPATIENT)
Dept: DERMATOLOGY | Facility: CLINIC | Age: 42
End: 2017-11-16

## 2017-11-16 NOTE — TELEPHONE ENCOUNTER
Nothing will be covered if you are ordering Provider is my understanding as you are not an authorized [place] Medicaid Provider....    Penny Souza RN

## 2017-11-16 NOTE — TELEPHONE ENCOUNTER
Can we discuss with patient and see if perhaps her primary care doc would order?      Otherwise we will have to try something different

## 2017-11-16 NOTE — TELEPHONE ENCOUNTER
"Spoke to patient who states: \"I do have [place] care insurance- the Pharmacy did not know that I no longer have [place] Medicaid. So the pharmacy told me to call  [facility] and they would have it, but all I know is the [facility] pharmacy has told me that they could not process it and the gal was not very pleasant...\"     I will call Specialty pharmacy to make them aware that patient has current [place] care insurance.     Message left for Specialty pharmacy to call us back as maybe her insurance is requiring her to use [facility]? But makes no sense as [facility] is in [place] and patient lives in [place] and reports she has had [place] Care \"for about 3 weeks now\"    Penny Souza RN        "

## 2017-11-16 NOTE — TELEPHONE ENCOUNTER
[facility] calling.     Patient has [place] Medicaid and Humira is not covered by her insurance as Dr Belle is not enrolled as an authorized as a [place] Medicaid Provider.     Please advise. Penny Souza RN

## 2017-11-17 ENCOUNTER — HOSPITAL ENCOUNTER (OUTPATIENT)
Dept: WOUND CARE | Facility: CLINIC | Age: 42
Discharge: HOME | End: 2017-11-17
Attending: SURGERY | Admitting: SURGERY
Payer: MEDICAID

## 2017-11-17 PROCEDURE — 99214 OFFICE O/P EST MOD 30 MIN: CPT

## 2017-11-17 PROCEDURE — A6235 HYDROCOLLD DRG >16<=48 W/O B: HCPCS

## 2017-11-17 PROCEDURE — 99213 OFFICE O/P EST LOW 20 MIN: CPT

## 2017-11-17 NOTE — DISCHARGE INSTRUCTIONS
To change pouch:    1.) Shake can of cement and set aside.    2.) Prepare pouch by attaching the oval barrier ring to the back of the barrier then roll the barrier from inside to the same size opening as the oval barrier.  Snap pouch together.  Apply a thin layer of cement and set aside to dry while you prepare your skin. Prepare either 1/2 of a large adapt ring or about 1/2 of a sheet of coloplast protective barrier and apply cement to the sides (to the adhesive side on the coloplast barrier) where will mostly just meet skin, set aside.   3.)  Remove old pouch and clean per usual  4.) Apply a thin layer of cement to your intact skin and fan to dry to tacky, (couple of minutes or so)  5.) Apply at least 2 layers of Aquacel Ag into the wound to fill to skin level  6.) Apply either the 1/2 adapt ring OR 1/2 of the Coloplast protective barrier wafer over the wound to hold in the Aquacel Ag  7.) Press pouch in place centered over stoma and hold hand over for several minutes to warm up the barrier for a better fit.    8.) Attach belt snuggly    Destini Knott RN, CWOCN

## 2017-11-17 NOTE — IP AVS SNAPSHOT
MRN:[id number]                      After Visit Summary   11/17/2017    Yuni Lam    MRN: [id number]           Visit Information        Provider Department      11/17/2017  2:45 PM Sky Alegre Nurse - [facility]           Review of your medicines      UNREVIEWED medicines. Ask your doctor about these medicines        Dose / Directions    adalimumab 40 MG/0.8ML prefilled syringe kit   Commonly known as:  HUMIRA   Used for:  Pyoderma gangrenosum        80 mg Day 1 and Day 2; 80 mg on Day 15; (Day 29), 40 mg every other week   Quantity:  6 kit   Refills:  3       augmented betamethasone dipropionate 0.05 % cream   Commonly known as:  DIPROLENE-AF   Used for:  Pyoderma gangrenosum        Apply sparingly to affected area twice daily as needed.  Do not apply to face.   Quantity:  100 g   Refills:  3       CAMPRAL PO        Take by mouth 3 times daily   Refills:  0       FUROSEMIDE PO        Dose:  20 mg   Take 20 mg by mouth daily   Refills:  0       gabapentin 600 MG tablet   Commonly known as:  NEURONTIN   Used for:  Chronic pain syndrome        Dose:  1200 mg   Take 2 tablets (1,200 mg) by mouth 3 times daily   Quantity:  180 tablet   Refills:  1       IRON SUPPLEMENT PO        Dose:  325 mg   Take 325 mg by mouth daily   Refills:  0       PANTOPRAZOLE SODIUM PO        Dose:  40 mg   Take 40 mg by mouth daily   Refills:  0       sertraline 100 MG tablet   Commonly known as:  ZOLOFT   Used for:  Anxiety        Dose:  100 mg   Take 1 tablet (100 mg) by mouth daily   Quantity:  30 tablet   Refills:  2       traMADol 50 MG tablet   Commonly known as:  ULTRAM   Used for:  Chronic pain syndrome        Dose:  50 mg   Take 1 tablet (50 mg) by mouth every 6 hours as needed for pain maximum 3-4 tablet(s) per day   Quantity:  90 tablet   Refills:  0       traZODone 50 MG tablet   Commonly known as:  DESYREL   Used for:  Insomnia, unspecified type        Dose:  150 mg   Take 3 tablets  (150 mg) by mouth At Bedtime   Quantity:  90 tablet   Refills:  2       VITAMIN D (CHOLECALCIFEROL) PO        Dose:  1000 Units   Take 1,000 Units by mouth daily   Refills:  0                Protect others around you: Learn how to safely use, store and throw away your medicines at www.disposemymeds.org.         Follow-ups after your visit        Your next 10 appointments already scheduled     Nov 21, 2017  2:00 PM CST   Office Visit with Et Nurse - [facility] ([facility])    [address] [address]   [phone]           Bring a current list of meds and any records pertaining to this visit. For Physicals, please bring immunization records and any forms needing to be filled out. Please arrive 10 minutes early to complete paperwork.            Nov 27, 2017 11:00 AM CST   Office Visit with Et Nurse - [facility] ([facility])    [address] [address]   [phone]           Bring a current list of meds and any records pertaining to this visit. For Physicals, please bring immunization records and any forms needing to be filled out. Please arrive 10 minutes early to complete paperwork.            Dec 12, 2017  2:00 PM CST   [facility] with Alejandro Belle MD   [facility]    [address] [address]   [phone]               Care Instructions        Further instructions from your care team       To change pouch:    1.) Shake can of cement and set aside.    2.) Prepare pouch by attaching the oval barrier ring to the back of the barrier then roll the barrier from inside to the same size opening as the oval barrier.  Snap pouch together.  Apply a thin layer of cement and set aside to dry while you prepare your skin. Prepare either 1/2 of a large adapt ring or about 1/2 of a sheet of coloplast protective barrier and apply cement  to the sides (to the adhesive side on the coloplast barrier) where will mostly just meet skin, set aside.   3.)  Remove old pouch and clean per usual  4.) Apply a thin layer of cement to your intact skin and fan to dry to tacky, (couple of minutes or so)  5.) Apply at least 2 layers of Aquacel Ag into the wound to fill to skin level  6.) Apply either the 1/2 adapt ring OR 1/2 of the Coloplast protective barrier wafer over the wound to hold in the Aquacel Ag  7.) Press pouch in place centered over stoma and hold hand over for several minutes to warm up the barrier for a better fit.    8.) Attach belt snuggly    Destini Knott RN, CWOCN      Additional Information About Your Visit        MyChart Information     [facility] gives you secure access to your electronic health record. If you see a primary care provider, you can also send messages to your care team and make appointments. If you have questions, please call your primary care clinic.  If you do not have a primary care provider, please call [phone] and they will assist you.        Care EveryWhere ID     This is your Care EveryWhere ID. This could be used by other organizations to access your [facility] medical records  [id number]         Primary Care Provider Office Phone # Fax #    Matthias Al PA-C [phone]      Equal Access to Services     SALTY AHUMADA : Hadmanda deeo Sofrancoise, waaxda luqadaha, qaybta kaalmada adesaleemyapooja, maribel huertas . So [facility] [phone].    ATENCIÓN: Si habla español, tiene a stephen disposición servicios gratuitos de asistencia lingüística. Llame al [phone].    We comply with applicable federal civil rights laws and [place] laws. We do not discriminate on the basis of race, color, national origin, age, disability, sex, sexual orientation, or gender identity.            Thank you!     Thank you for choosing [facility] for your care. Our goal is always to provide you with  excellent care. Hearing back from our patients is one way we can continue to improve our services. Please take a few minutes to complete the written survey that you may receive in the mail after you visit with us. Thank you!             Medication List: This is a list of all your medications and when to take them. Check marks below indicate your daily home schedule. Keep this list as a reference.      Medications           Morning Afternoon Evening Bedtime As Needed    adalimumab 40 MG/0.8ML prefilled syringe kit   Commonly known as:  HUMIRA   80 mg Day 1 and Day 2; 80 mg on Day 15; (Day 29), 40 mg every other week                                augmented betamethasone dipropionate 0.05 % cream   Commonly known as:  DIPROLENE-AF   Apply sparingly to affected area twice daily as needed.  Do not apply to face.                                CAMPRAL PO   Take by mouth 3 times daily                                FUROSEMIDE PO   Take 20 mg by mouth daily                                gabapentin 600 MG tablet   Commonly known as:  NEURONTIN   Take 2 tablets (1,200 mg) by mouth 3 times daily                                IRON SUPPLEMENT PO   Take 325 mg by mouth daily                                PANTOPRAZOLE SODIUM PO   Take 40 mg by mouth daily                                sertraline 100 MG tablet   Commonly known as:  ZOLOFT   Take 1 tablet (100 mg) by mouth daily                                traMADol 50 MG tablet   Commonly known as:  ULTRAM   Take 1 tablet (50 mg) by mouth every 6 hours as needed for pain maximum 3-4 tablet(s) per day                                traZODone 50 MG tablet   Commonly known as:  DESYREL   Take 3 tablets (150 mg) by mouth At Bedtime                                VITAMIN D (CHOLECALCIFEROL) PO   Take 1,000 Units by mouth daily

## 2017-11-20 ENCOUNTER — TELEPHONE (OUTPATIENT)
Dept: FAMILY MEDICINE | Facility: CLINIC | Age: 42
End: 2017-11-20

## 2017-11-20 DIAGNOSIS — F41.9 ANXIETY: Primary | ICD-10-CM

## 2017-11-20 DIAGNOSIS — Z72.0 TOBACCO ABUSE: ICD-10-CM

## 2017-11-20 DIAGNOSIS — F10.21 ALCOHOL DEPENDENCE IN REMISSION (H): ICD-10-CM

## 2017-11-20 RX ORDER — HYDROXYZINE HYDROCHLORIDE 25 MG/1
25-50 TABLET, FILM COATED ORAL 3 TIMES DAILY PRN
Qty: 90 TABLET | Refills: 2 | Status: SHIPPED | OUTPATIENT
Start: 2017-11-20 | End: 2018-01-31

## 2017-11-20 RX ORDER — ACAMPROSATE CALCIUM 333 MG/1
666 TABLET, DELAYED RELEASE ORAL 3 TIMES DAILY
Qty: 180 TABLET | Refills: 2 | Status: SHIPPED | OUTPATIENT
Start: 2017-11-20 | End: 2017-12-04

## 2017-11-20 NOTE — TELEPHONE ENCOUNTER
Pt is new to [place].  She moved to [place] and is transferring her care from Dr Alexander Pearson in [facility].  Hydroxyzine is used for anxiety.  Campral is to manage alcohol cravings.  Nicotine gum is for smoking cessation.  Pt is trying to quit for the past 2 weeks.  She was a 1 pack per day smoker.  She is down to 3/4 pack per day.  Routed to provider for consideration.  Pt was seen 11/17/17.    Brynn Rivera RN

## 2017-11-20 NOTE — PROGRESS NOTES
"Subjective: Yuni Lam, 42 year old female for recheck on her peristomal pyoderma gangrenosa pouching done yesterday.  Patient was referred by MICAELA Roy. Per pt the pouch placed Tuesday stayed in place until Thursday but the one she placed Thursday is leaking now.  She does not have the Humira NOR the topical medication.  Sounds like there are some ongoing insurance issues with the Humira and she does not know about the topical medication.  She asks who will show her how to use the Humira.        HPI from Initial Visit:  Pt as diagnosed with UC and had colectomy in '09 with temporary ileostomy then a J Pouch.  She had issues with the J Pouch for 7 years eventually deciding to have a take down of this with ileostomy this past July.  Per pt, she has has pouching issues since this start but this eventually was under control and down to a 2-3 day wear time until more recently when she developed PG about 4 weeks ago.  Pt is not sure if a biopsy was done but states they did a \"scraping\" initially and an oral steriod for a week plus local steriod injections then it got worse. She is now needing to replace pouches 2-3 times per day.  Pt is looking to establish care here as due to moving will no longer be going to [place] clinic.  She is seeing Dr. Belle in dermatology tomorrow for treatment as well.  Patient is wearing a ConvaTec Esteem Flex Convex pouch [id number] cut to fit up to 35mm with belt.  Has tried rings but seemed possibly worse with rings.  Has been using hydrofera blue over the wound but having issues with this slipping out and into the pouch. Do note that though stoma is oval with below noted dimensions pt is cutting a round opening that does not adequately fit her stoma.      Co-morbidities include anxiety.  She also states she saw a GI specialist in [place] on November 1st due to \"problems with my bottom\" and was scoped.  Concern was for Crohn's though this provider did not think this was the " "case and per pt he thought is was more of a \"diverson colitis\".      Objective: Patient's ileostomy is located in the right upper quadrant.  She is wearing a Moldable flat convatec barrier and pouch with a [place] convex oval barrier ring #[id number] with Aquacel Ag in the wound and 1/3rd of a Coloplast Protective barrier sheet over wound to secure the Aquacel Ag.  This has completely let loose from the bottom and is leaking profusely.    Output: Loose stool, varies from more watery to thicker.  Taking a fiber wafer and is also on iron which thickens  Stoma color: pink  Viability: good  Os/Lumen: at apex  Tone: good  Profile: minimal and when sitting and especially standing does draw into soft abd more  Shape: oval  MCJ: attached, some denudement along MCJ inferiorly, stoma is flush at MCJ and protrudes some at os.  Noting what may be some early PG breakdown beginning at MC junction at about 2:00  Stoma Size: [id number]  Peristomal Skin: there is a large full thickness wound superior and right up to stoma with no skin bridge to separate  Abd: Pt has a very soft abd around the stoma despite being very thin with narrow waist and there is a lot of creasing in area with sitting.    Pain: intense pain with touch all around stoma but especially at wound and near MC junction at 7-9:00     Wound #1 Pyoderma wound above stoma  Specific Dimensions (length x width x depth, in cm) :  2.3cm x 2.6cmx up to 0.4cm estimated  Tunneling: none noted  Undermining: minimal now as has opened up more, does not tolerate much touch to assess  Wound Base: 90% slough, grayish and 10% pink  Palpation of the wound bed: firm though barely able to palpate due to pain  Periwound Skin: some denudement distal and laterally from leaking stool  Temperature: WNL  Drainage: moderate  Odor: no  Pain:  intense          Assessment:  Ileostomy with already complicated pouching due to soft creasing abd, oval stoma with minimal profile and now extremely complicated " due to large full thickness necrotic and painful PG ulceration next to stoma    Still not actively treating PG as is waiting on prescriptions    Plan:  Needs medical management for PG, awaiting Diprolene cream which will need to be applied to lesion daily under the Aquacel Ag and may further complicate pouching since is a cream in addition to presenting the problem of absolutely needing to remove pouch every day to apply.   Awaiting Humira.  [facility] with  [facility] and per them pt needs to contact them, they do still have only [place] insurance in system and need to talk with her to get set up.  Once she speaks with pharmacy and meds approved they will be mailed out to her so at best looking a early to mid next week.  Pharmacy did tell writer when asked that Diprolene cream may be able to go to any more local pharmacy and pt may be able to  sooner but pharmacy she chooses would need to contact them to have the Rx transferred.    Stoma requires convexity for proper seal though convexity likely contributing to discomfort, need to try to limit convexity as much as able.  Wound is necrotic and will need autolytic debridement since sharp debridement will exacerbate PG.   Today continue the same plan except added skin bond cement and will try 1/2 of a 4 inch Adapt ring over superior peristomal wound area in place of the Coloplast barrier sheet in addition to the Aquacel, if this will also hold the Aquacel and manage drainage then would be easier to remove daily compared to the Coloplast protective barrier sheet.  Continue ConvaTec flat moldable pouch [id number] with pouch and Cam convex oval ring #[id number] and belt. Pt was given a few more ConvaTec pouches and barriers and convex rings and another Coloplast protective barrier sheet and 2 4 inch adapt rings.   When she gets the Diprolene will need to change pouch daily to apply. Pt was given verbal instructions on adding this  under the Aquacel Ag.      Follow-up Tuesday.  Will need to order more pouches then once able to reassess this plan.    Pt Instructions Provided and Reviewed:  To change pouch:    1.) Shake can of cement and set aside.    2.) Prepare pouch by attaching the oval barrier ring to the back of the barrier then roll the barrier from inside to the same size opening as the oval barrier.  Snap pouch together.  Apply a thin layer of cement and set aside to dry while you prepare your skin. Prepare either 1/2 of a large adapt ring or about 1/2 of a sheet of coloplast protective barrier and apply cement to the sides (to the adhesive side on the coloplast barrier) where will mostly just meet skin, set aside.   3.)  Remove old pouch and clean per usual  4.) Apply a thin layer of cement to your intact skin and fan to dry to tacky, (couple of minutes or so)  5.) Apply at least 2 layers of Aquacel Ag into the wound to fill to skin level  6.) Apply either the 1/2 adapt ring OR 1/2 of the Coloplast protective barrier wafer over the wound to hold in the Aquacel Ag  7.) Press pouch in place centered over stoma and hold hand over for several minutes to warm up the barrier for a better fit.    8.) Attach belt snuggly    Verbal education & demonstration provided.    Face to face time approximately 50minutes.  Destini Knott RN, CWOCN     [phone]

## 2017-11-20 NOTE — TELEPHONE ENCOUNTER
Reason for Call:  Medication or medication refill:    Do you use a [facility]?  Name of the pharmacy and phone number for the current request:  [facility] [phone]    Name of the medication requested: Pt calling today for 3 requests from Jonelle VELAZQUEZ  1.  Hydroxyzine - 50mg - 2 tabs by mouth 3x daily - Pt states this is not prm, she takes every day at this dose.  2.  Campral - 333mg - 2 capsules 3 times daily   3.  Nicotine gum Rx    Other request:     Can we leave a detailed message on this number? YES    Phone number patient can be reached at: Home number on file [phone] (home)    Best Time: any    Call taken on 11/20/2017 at 2:02 PM by Rona Burt

## 2017-11-20 NOTE — ADDENDUM NOTE
Encounter addended by: Destini Knott on: 11/20/2017  4:17 PM<BR>     Actions taken: Chief Complaint modified, Episode edited, Pend clinical note, Order Reconciliation Section accessed, Sign clinical note, Charge Capture section accepted

## 2017-11-20 NOTE — TELEPHONE ENCOUNTER
Don't see these prescriptions in the pt's medication module.  Called and left message to call clinic back to discuss.    Ama MELARA RN

## 2017-11-21 ENCOUNTER — TELEPHONE (OUTPATIENT)
Dept: DERMATOLOGY | Facility: CLINIC | Age: 42
End: 2017-11-21

## 2017-11-21 ENCOUNTER — HOSPITAL ENCOUNTER (OUTPATIENT)
Dept: WOUND CARE | Facility: CLINIC | Age: 42
Discharge: HOME | End: 2017-11-21
Attending: SURGERY | Admitting: SURGERY
Payer: MEDICAID

## 2017-11-21 DIAGNOSIS — Z93.2 ILEOSTOMY STATUS (H): Primary | ICD-10-CM

## 2017-11-21 PROCEDURE — 99213 OFFICE O/P EST LOW 20 MIN: CPT

## 2017-11-21 PROCEDURE — 99214 OFFICE O/P EST MOD 30 MIN: CPT

## 2017-11-21 NOTE — PROGRESS NOTES
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  She still does not have the topical or the Humira.  I did speak with derm clinic  today and she did then call the [facility] to clear up misunderstandings about pt's insurance, ( they had insisted was [place] though [place],) so per her now awaiting approval [place] insurance.  Pt states pouches not lasting even a day.  She thinks the ConvaTec barrier is too stiff for her abd.       Patient was referred by MICAELA Roy.      HPI from Initial Visit:  Pt as diagnosed with UC and had colectomy in '09 with temporary ileostomy then a J Pouch.  She had issues with the J Pouch for 7 years eventually deciding to have a take down of this with ileostomy this past July.  Per pt, she has has pouching issues since this start but this eventually was under control and down to a 2-3 day wear time until more recently when she developed PG about 4 weeks ago.  Pt is not sure if a biopsy was done but states they did a \"scraping\" initially and an oral steriod for a week plus local steriod injections then it got worse. She is now needing to replace pouches 2-3 times per day.  Pt is looking to establish care here as due to moving will no longer be going to [place] [facility].  She is seeing Dr. Belle in dermatology tomorrow for treatment as well.  Patient is wearing a ConvaTec Esteem Flex Convex pouch [id number] cut to fit up to 35mm with belt.  Has tried rings but seemed possibly worse with rings.  Has been using hydrofera blue over the wound but having issues with this slipping out and into the pouch. Do note that though stoma is oval with below noted dimensions pt is cutting a round opening that does not adequately fit her stoma.      Co-morbidities include anxiety.  She also states she saw a GI specialist in [place] on November 1st due to \"problems with my bottom\" and " "was scoped.  Concern was for Crohn's though this provider did not think this was the case and per pt he thought is was more of a \"diverson colitis\".     Pouching tried so far:    1.) Aquacel Ag to wound followed by a piece of Coloplast Brava protective sheet to hold it in place. Shana cut to fit convex pouch #[id number] for softer convexity with Cam convex oval ring #[id number] and belt.  This did hold with no leaks when removed following day at dermatology appointment though was very comfortable for pt - too much pressure  2.) Moldable flat convatec 873662 barrier and pouch with a Baring convex oval barrier ring #[id number] with Aquacel Ag in the wound and 1/3rd of a Coloplast Protective barrier sheet over wound to secure the Aquacel Ag.  When initially placed in dermatology clinic on 11/14/17 pt states lasted 2 days though pt has tried again since and leaked is less than one day  3.) Moldable flat convatec barrier  [id number] and pouch with a Cam convex oval barrier ring #[id number] added skin bond cement and used Aquacel ag in wound with 1/2 of a 4 inch Adapt ring over superior peristomal wound area in place of the Coloplast barrier sheet to try as more skin friendly (skin bond cement on barrier ring except wound on on barrier/convex ring).  Leaking daily.     Objective: Patient's ileostomy is located in the right upper quadrant.  She has changed her pouch twice today.  Currently has on a Shana cut to fit convex with convex oval barrier ring and has added some type of a thinner hydrocolloid to skin to secure the Aquacel, had placed this all around her stoma and states cut opening to size of barrier ring though appears possibly smaller.  Slightly undermining of stool at 6:00.     Output: Loose stool, varies from more watery to thicker.  Taking a fiber wafer and is also on iron which thickens and feels this is problematic as causes stool to undermine distally  Stoma color: pink  Viability: good  Os/Lumen: at apex, is a " loop stoma with stool loop having good profile though does tip laterally, remainder of stoma is flush  Tone: good  Profile: minimal and when sitting and especially standing does draw into soft abd more  Shape: oval  MCJ: attached, denudement along MCJ inferiorly.  Noting what may be some early PG breakdown beginning at MC junction at about 2:00, 7x3mm  Stoma Size: 7/8 x 1 1/4 to 1 3/8  Peristomal Skin: there is a large full thickness wound superior and right up to stoma with no skin bridge to separate  Abd: Pt has a very soft abd around the stoma despite being very thin with narrow waist and there is a lot of creasing in area with sitting.  Waist is very small so stoma is close to navel and tends to leak toward navel  Pain: intense pain with touch all around stoma but especially at wound and near MC junction at 7-9:00     Wound #1 Pyoderma wound above stoma  Specific Dimensions (length x width x depth, in cm) :  1.5cm x 2.8cmx up to 0.4cm estimated  Tunneling: none noted  Undermining: none  Wound Base: 80% slough and 20% pink  Palpation of the wound bed: unable to palpate due to pain  Periwound Skin: some denudement distal and laterally from leaking stool  Temperature: WNL  Drainage: moderate  Odor: no  Pain:  intense          Assessment:  Ileostomy with already complicated pouching due to soft creasing abd, oval loop stoma with minimal profile and now extremely complicated due to large full thickness necrotic and painful PG ulceration next to stoma    Still not actively treating PG as is waiting on prescriptions    Plan:  Awaiting Diprolene cream which will need to be applied to lesion daily under the Aquacel Ag and may further complicate pouching since is a cream in addition to presenting the problem of absolutely needing to remove pouch every day to apply.   Awaiting Humira.     Leaking seems to be occurring most from underside of stoma and toward midline.      Stoma requires convexity for proper seal though  convexity likely contributing to discomfort, need to try to limit convexity as much as able.  Wound is necrotic and will need autolytic debridement since sharp debridement will exacerbate PG.   Today denudement was crusted with adapt powder and no-ting barrier then tried more flexible light convex oval shaped system, (Coloplast Sensura Lance Soft Convex #[id number] cut to fit stoma,) keeping more flush to abd and used a Coloplast Protective barrier sheet around stoma, (opening cut to fit stoma,) rather than just above.  Expect will be needing DAILY change for now due to draining wound so if we can determine something provides a reliable 24 hour wear time and is tolerable to wear then will place order for supplies.  Did not have any more of this pouch so pt supplied with 3 additional Shana cut to fit convex pouches to try the same with and was instructed to add cement if this is also not holding, was also given 2 samples of a Coloplast protective seal #42921 to try if still leaking.  Pt to follow-up Friday.      Ordered also were Cam Joyner 5 pouches with 1 3/8 opening, these are expected to arrive tomorrow to facility and may try Friday, may need to use oval convex ring with this for better fit against stoma at underside where she leaks.      Did order a box of Sensicare Sting Free Adhesive Remover wipes from [employer].    Pt Instructions Provided and Reviewed:  Placed today:     Sore skin dusted with powder and no-sting skin barrier.  Wound filled with Aquacel Ag in 2 layers.      Coloplast Brava Protective barrier sheet cut to fit stoma per pattern and placed ALL around stoma.  Coloplast Sensura Lance convex light pouch placed over this with same opening as per pattern on the Protective barrier sheet.  I do expect you will need to change daily.      If this fails try -  Same as above with the Shana pouch samples adding cement on both your skin and the back of the Coloplast sheet.      Next may try an  addition of the Coloplast barrier ring [id number] around the stoma after application of the Coloplast protective sheet.    Face to face time approximately 50minutes.  Destini Knott RN, CWOCN     [phone]

## 2017-11-21 NOTE — TELEPHONE ENCOUNTER
PA Initiation    Medication: RADHA FREGOSO-CROHNS  Insurance Company: [place] Medicaid ([facility] - Phone [phone] Fax [phone]  Pharmacy Filling the Rx: [facility] - [place], [place] - [facility] [address]  Filling Pharmacy Phone: [phone]  Filling Pharmacy Fax: [phone]  Start Date: 11/21/2017    [facility] Prior Authorization Team   Phone: [phone]  Fax: [phone]

## 2017-11-21 NOTE — DISCHARGE INSTRUCTIONS
Placed today:     Sore skin dusted with powder and no-sting skin barrier.  Wound filled with Aquacel Ag in 2 layers.      Coloplast Brava Protective barrier sheet cut to fit stoma per pattern and placed ALL around stoma.  Coloplast Sensura [place] convex light pouch placed over this with same opening as per pattern on the Protective barrier sheet.  I do expect you will need to change daily.      If this fails try -  Same as above with the Shana pouch samples adding cement on both your skin and the back of the Coloplast sheet.      Next may try an addition of the Coloplast barrier ring [id number] around the stoma after application of the Coloplast protective sheet.    Destini Knott RN, CWOCN [phone]

## 2017-11-21 NOTE — IP AVS SNAPSHOT
MRN:[id number]                      After Visit Summary   11/21/2017    Yuni Lam    MRN: [id number]           Visit Information        Provider Department      11/21/2017  2:00 PM Sky Alegre Nurse - [facility]           Review of your medicines      UNREVIEWED medicines. Ask your doctor about these medicines        Dose / Directions    adalimumab 40 MG/0.8ML prefilled syringe kit   Commonly known as:  HUMIRA   Used for:  Pyoderma gangrenosum        80 mg Day 1 and Day 2; 80 mg on Day 15; (Day 29), 40 mg every other week   Quantity:  6 kit   Refills:  3       augmented betamethasone dipropionate 0.05 % cream   Commonly known as:  DIPROLENE-AF   Used for:  Pyoderma gangrenosum        Apply sparingly to affected area twice daily as needed.  Do not apply to face.   Quantity:  100 g   Refills:  3       * CAMPRAL PO        Take by mouth 3 times daily   Refills:  0       * acamprosate 333 MG EC tablet   Commonly known as:  CAMPRAL   Used for:  Alcohol dependence in remission (H)        Dose:  666 mg   Take 2 tablets (666 mg) by mouth 3 times daily   Quantity:  180 tablet   Refills:  2       FUROSEMIDE PO        Dose:  20 mg   Take 20 mg by mouth daily   Refills:  0       gabapentin 600 MG tablet   Commonly known as:  NEURONTIN   Used for:  Chronic pain syndrome        Dose:  1200 mg   Take 2 tablets (1,200 mg) by mouth 3 times daily   Quantity:  180 tablet   Refills:  1       hydrOXYzine 25 MG tablet   Commonly known as:  ATARAX   Used for:  Anxiety        Dose:  25-50 mg   Take 1-2 tablets (25-50 mg) by mouth 3 times daily as needed for anxiety   Quantity:  90 tablet   Refills:  2       IRON SUPPLEMENT PO        Dose:  325 mg   Take 325 mg by mouth daily   Refills:  0       nicotine polacrilex 2 MG gum   Commonly known as:  [employer] NICOTINE POLACRILEX   Used for:  Tobacco abuse        Dose:  2 mg   Place 1 each (2 mg) inside cheek every 2 hours as needed for smoking cessation    Quantity:  100 each   Refills:  1       PANTOPRAZOLE SODIUM PO        Dose:  40 mg   Take 40 mg by mouth daily   Refills:  0       sertraline 100 MG tablet   Commonly known as:  ZOLOFT   Used for:  Anxiety        Dose:  100 mg   Take 1 tablet (100 mg) by mouth daily   Quantity:  30 tablet   Refills:  2       traMADol 50 MG tablet   Commonly known as:  ULTRAM   Used for:  Chronic pain syndrome        Dose:  50 mg   Take 1 tablet (50 mg) by mouth every 6 hours as needed for pain maximum 3-4 tablet(s) per day   Quantity:  90 tablet   Refills:  0       traZODone 50 MG tablet   Commonly known as:  DESYREL   Used for:  Insomnia, unspecified type        Dose:  150 mg   Take 3 tablets (150 mg) by mouth At Bedtime   Quantity:  90 tablet   Refills:  2       VITAMIN D (CHOLECALCIFEROL) PO        Dose:  1000 Units   Take 1,000 Units by mouth daily   Refills:  0       * Notice:  This list has 2 medication(s) that are the same as other medications prescribed for you. Read the directions carefully, and ask your doctor or other care provider to review them with you.             Protect others around you: Learn how to safely use, store and throw away your medicines at www.disposemymeds.org.         Follow-ups after your visit        Your next 10 appointments already scheduled     Nov 24, 2017  1:30 PM CST   Office Visit with Et Nurse - [facility] ([facility])    [address] [facility] [address]   [phone]           Bring a current list of meds and any records pertaining to this visit. For Physicals, please bring immunization records and any forms needing to be filled out. Please arrive 10 minutes early to complete paperwork.            Nov 27, 2017 11:00 AM CST   Office Visit with Et Nurse - [facility] ([facility])    [address] [facility] [address]   [phone]           Bring a current list of meds and any records  pertaining to this visit. For Physicals, please bring immunization records and any forms needing to be filled out. Please arrive 10 minutes early to complete paperwork.            Dec 12, 2017  2:00 PM CST   [facility] with Alejandro Belle MD   [facility]    [address]  [facility] [place] [place] [address]   [phone]               Care Instructions        Further instructions from your care team       Placed today:     Sore skin dusted with powder and no-sting skin barrier.  Wound filled with Aquacel Ag in 2 layers.      Coloplast Brava Protective barrier sheet cut to fit stoma per pattern and placed ALL around stoma.  Coloplast Sensura Lance convex light pouch placed over this with same opening as per pattern on the Protective barrier sheet.  I do expect you will need to change daily.      If this fails try -  Same as above with the Shana pouch samples adding cement on both your skin and the back of the Coloplast sheet.      Next may try an addition of the Coloplast barrier ring [id number] around the stoma after application of the Coloplast protective sheet.    Destini Knott RN, [phone]         Additional Information About Your Visit        [employer] Information     [facility] gives you secure access to your electronic health record. If you see a primary care provider, you can also send messages to your care team and make appointments. If you have questions, please call your primary care clinic.  If you do not have a primary care provider, please call [phone] and they will assist you.        Care EveryWhere ID     This is your Care EveryWhere ID. This could be used by other organizations to access your [facility] medical records  [id number]         Primary Care Provider Office Phone # Fax #    MICAELA Patel [phone]      Equal Access to Services     SALTY AHUMADA AH: uvaldo Jc qaybta  maribel prathersaleem tayloraamarcio ah. [facility] [phone].    ATENCIÓN: Si veronica salomon, tiene a stephen disposición servicios gratuitos de asistencia lingüística. Andrew al [phone].    We comply with applicable federal civil rights laws and [place] laws. We do not discriminate on the basis of race, color, national origin, age, disability, sex, sexual orientation, or gender identity.            Thank you!     Thank you for choosing [facility] for your care. Our goal is always to provide you with excellent care. Hearing back from our patients is one way we can continue to improve our services. Please take a few minutes to complete the written survey that you may receive in the mail after you visit with us. Thank you!             Medication List: This is a list of all your medications and when to take them. Check marks below indicate your daily home schedule. Keep this list as a reference.      Medications           Morning Afternoon Evening Bedtime As Needed    adalimumab 40 MG/0.8ML prefilled syringe kit   Commonly known as:  HUMIRA   80 mg Day 1 and Day 2; 80 mg on Day 15; (Day 29), 40 mg every other week                                augmented betamethasone dipropionate 0.05 % cream   Commonly known as:  DIPROLENE-AF   Apply sparingly to affected area twice daily as needed.  Do not apply to face.                                * CAMPRAL PO   Take by mouth 3 times daily                                * acamprosate 333 MG EC tablet   Commonly known as:  CAMPRAL   Take 2 tablets (666 mg) by mouth 3 times daily                                FUROSEMIDE PO   Take 20 mg by mouth daily                                gabapentin 600 MG tablet   Commonly known as:  NEURONTIN   Take 2 tablets (1,200 mg) by mouth 3 times daily                                hydrOXYzine 25 MG tablet   Commonly known as:  ATARAX   Take 1-2 tablets (25-50 mg) by mouth 3 times daily as needed for anxiety                                 IRON SUPPLEMENT PO   Take 325 mg by mouth daily                                nicotine polacrilex 2 MG gum   Commonly known as:  CVS NICOTINE POLACRILEX   Place 1 each (2 mg) inside cheek every 2 hours as needed for smoking cessation                                PANTOPRAZOLE SODIUM PO   Take 40 mg by mouth daily                                sertraline 100 MG tablet   Commonly known as:  ZOLOFT   Take 1 tablet (100 mg) by mouth daily                                traMADol 50 MG tablet   Commonly known as:  ULTRAM   Take 1 tablet (50 mg) by mouth every 6 hours as needed for pain maximum 3-4 tablet(s) per day                                traZODone 50 MG tablet   Commonly known as:  DESYREL   Take 3 tablets (150 mg) by mouth At Bedtime                                VITAMIN D (CHOLECALCIFEROL) PO   Take 1,000 Units by mouth daily                                * Notice:  This list has 2 medication(s) that are the same as other medications prescribed for you. Read the directions carefully, and ask your doctor or other care provider to review them with you.

## 2017-11-21 NOTE — IP AVS SNAPSHOT
[facility]    [address] [facility] [address]    Phone:  [phone]    Fax:  [phone]                                       After Visit Summary   11/21/2017    Yuni Lam    MRN: [id number]           After Visit Summary Signature Page     I have received my discharge instructions, and my questions have been answered. I have discussed any challenges I see with this plan with the nurse or doctor.    ..........................................................................................................................................  Patient/Patient Representative Signature      ..........................................................................................................................................  Patient Representative Print Name and Relationship to Patient    ..................................................               ................................................  Date                                            Time    ..........................................................................................................................................  Reviewed by Signature/Title    ...................................................              ..............................................  Date                                                            Time

## 2017-11-22 ENCOUNTER — TELEPHONE (OUTPATIENT)
Dept: DERMATOLOGY | Facility: CLINIC | Age: 42
End: 2017-11-22

## 2017-11-22 NOTE — TELEPHONE ENCOUNTER
Pt called stating that she has been approved for the cream, however, has been denied the Humira based on what Dr. Belle wrote. Pt would like to let Dr. Belle know that she has been diagnosed with diversion cholitis and crohn's which is part of the reason the Humira is necessary. Pt would like this included and have the PA for Humira resubmitted. Please advise.    Arlene [place]  [facility]

## 2017-11-22 NOTE — TELEPHONE ENCOUNTER
Letter was written and given to  to do stat appeal for pt. Will await response. Pt notified.   Olesya CHAVEZ RN BSN PHN  Specialty Clinics

## 2017-11-22 NOTE — TELEPHONE ENCOUNTER
PRIOR AUTHORIZATION DENIED    Medication: HUMIRA PEN-CROHNS-DENIED    Denial Date: 11/21/2017    Denial Rational: Humira pa can not be granted because the indicated diagnosis    Appeal Information:  Appeals can be done over the phone by faxing [phone]. Please let us know if you would like us to appeal this denial. If appealing, please provide a letter of medical necessity.    [facility] Prior Authorization Team   Phone: [phone]  Fax: [phone]

## 2017-11-22 NOTE — TELEPHONE ENCOUNTER
Please see note below and advise. Pt will need an appeal letter. Thanks  Olesya CHAVEZ RN BSN PHN  Specialty Clinics

## 2017-11-24 ENCOUNTER — HOSPITAL ENCOUNTER (OUTPATIENT)
Dept: WOUND CARE | Facility: CLINIC | Age: 42
Discharge: HOME | End: 2017-11-24
Attending: SURGERY | Admitting: SURGERY
Payer: MEDICAID

## 2017-11-24 PROCEDURE — 99213 OFFICE O/P EST LOW 20 MIN: CPT

## 2017-11-24 PROCEDURE — 99214 OFFICE O/P EST MOD 30 MIN: CPT

## 2017-11-24 NOTE — TELEPHONE ENCOUNTER
Called [place] [facility] to check status of PA, spoke to Mimi she states  it is still in pending/ review.   I informed them we are needing this as an urgent request as pt is worsening.     They will send over determination as soon as they receive one.     Zina Paula  Specialty CSS

## 2017-11-27 ENCOUNTER — HOSPITAL ENCOUNTER (OUTPATIENT)
Dept: WOUND CARE | Facility: CLINIC | Age: 42
Discharge: HOME | End: 2017-11-27
Attending: SURGERY | Admitting: SURGERY
Payer: MEDICAID

## 2017-11-27 PROCEDURE — 99214 OFFICE O/P EST MOD 30 MIN: CPT

## 2017-11-27 NOTE — PROGRESS NOTES
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  Pt states the pouch applied on Tuesday at 3pm in clinic leaked that evening.  She reapplied a pouch that then leaked in the night.  Pt states she is taking Senakot if her stool thickens because she feels the thicker stool \"pushes the pouch off\" and causes leakage.  In terms of her Humara and topical steroid prescriptions she states she is still waiting for the Humara to be approved by insurance and state the topical steroid cream has been approved and \"is in the mail\".       Patient was referred by MICAELA Roy.      HPI from Initial Visit:  Pt as diagnosed with UC and had colectomy in '09 with temporary ileostomy then a J Pouch.  She had issues with the J Pouch for 7 years eventually deciding to have a take down of this with ileostomy this past July.  Per pt, she has has pouching issues since this start but this eventually was under control and down to a 2-3 day wear time until more recently when she developed PG about 4 weeks ago.  Pt is not sure if a biopsy was done but states they did a \"scraping\" initially and an oral steriod for a week plus local steriod injections then it got worse. She is now needing to replace pouches 2-3 times per day.  Pt is looking to establish care here as due to moving will no longer be going to [facility].  She is seeing Dr. Belle in dermatology tomorrow for treatment as well.  Patient is wearing a ConvaTec Esteem Flex Convex pouch [id number] cut to fit up to 35mm with belt.  Has tried rings but seemed possibly worse with rings.  Has been using hydrofera blue over the wound but having issues with this slipping out and into the pouch. Do note that though stoma is oval with below noted dimensions pt is cutting a round opening that does not adequately fit her stoma.      Co-morbidities include anxiety.  She also states she saw a GI specialist in [place] on " "November 1st due to \"problems with my bottom\" and was scoped.  Concern was for Crohn's though this provider did not think this was the case and per pt he thought is was more of a \"diverson colitis\".     Pouching tried so far:    1.) Aquacel Ag to wound followed by a piece of Coloplast Brava protective sheet to hold it in place. Shana cut to fit convex pouch #[id number] for softer convexity with Atco convex oval ring #[id number] and belt.  This did hold with no leaks when removed following day at dermatology appointment though was very comfortable for pt - too much pressure  2.) Moldable flat convatec 116286 barrier and pouch with a Cam convex oval barrier ring #[id number] with Aquacel Ag in the wound and 1/3rd of a Coloplast Protective barrier sheet over wound to secure the Aquacel Ag.  When initially placed in dermatology clinic on 11/14/17 pt states lasted 2 days though pt has tried again since and leaked is less than one day  3.) Moldable flat convatec barrier  [id number] and pouch with a Atco convex oval barrier ring #[id number] added skin bond cement and used Aquacel ag in wound with 1/2 of a 4 inch Adapt ring over superior peristomal wound area in place of the Coloplast barrier sheet to try as more skin friendly (skin bond cement on barrier ring except wound on on barrier/convex ring).  Leaking daily.   4.)denudement was crusted with adapt powder and no-ting barrier then tried more flexible light convex oval shaped system, (Coloplast Sensura Lance Soft Convex #88354 cut to fit stoma,) keeping more flush to abd and used a Coloplast Protective barrier sheet around stoma, (opening cut to fit stoma,) rather than just above.  This leaked by evening the day it was applied.    Objective: Patient's ileostomy is located in the right upper quadrant.  She cannot even get 24 hours out of a pouching system.   The pouch she has in place she applied this morning and it currently has a seal.  She ate lettuce yesterday and also took a " "senakot to keep her stool from getting \"to thick\" so with removal of the pouch we are fighting constant liquid output.  Pt has denudement of the skin at 6 oclock but refuses to let me \"crust\" the skin as it is too painful.  She keeps trying to do her own cares and I cannot get my hands into the area to apply an appliance without her interference.      Output: Loose stool, varies from more watery to thicker.  Took Senakot!!!!  Stoma color: pink  Viability: good  Os/Lumen: at apex, is a loop stoma with stool loop having good profile though does tip laterally, remainder of stoma is flush  Tone: good  Profile: minimal and when sitting and especially standing does draw into soft abd more  Shape: oval  MCJ: attached, denudement along MCJ inferiorly.  Noting what may be some early PG breakdown beginning at MC junction at about 2:00, 7x3mm  Stoma Size: 7/8 x 1 1/4 to 1 3/8  Peristomal Skin: there is a large full thickness wound superior and right up to stoma with no skin bridge to separate.  The wound is 100% soft brown necrotic tissue.    Abd: Pt has a very soft abd around the stoma despite being very thin with narrow waist and there is a lot of creasing in area with sitting.  Waist is very small so stoma is close to navel and tends to leak toward navel  Pain: intense pain with touch all around stoma but especially at wound and near MC junction at 7-9:00     Wound #1 Pyoderma wound above stoma  Specific Dimensions (length x width x depth, in cm) :  1.5cm x 2.8cmx up to 0.4cm estimated  Tunneling: none noted  Undermining: none  Wound Base: 80% slough and 20% pink  Palpation of the wound bed: unable to palpate due to pain  Periwound Skin: some denudement distal and laterally from leaking stool  Temperature: WNL  Drainage: moderate  Odor: no  Pain:  intense          Assessment:  Ileostomy with already complicated pouching due to soft creasing abd, oval loop stoma with minimal profile and now extremely complicated due to large " full thickness necrotic and painful PG ulceration next to stoma.     Still not actively treating PG as is waiting on prescriptions - This has been 2 weeks now.      Pt is in need of Medical management of the underlying pyoderma by Gastroenterology.   Pt needs to quit taking stool softeners and adjust her diet to get stool to thicken up.  Pt also need some type of topical anesthesia so one can prep the skin properly before applying a pouch.      Pt is very shakey with her hands which makes pouching difficult for her to do well.  Suggest pt be managed at the [facility] where there is a more comprehensive team.      Plan:  Awaiting Diprolene cream - Pt states it has been approved and sent in the mail.  This will need to be applied to lesion daily under the Aquacel Ag and may further complicate pouching since is a cream in addition to presenting the problem of absolutely needing to remove pouch every day to apply.   Awaiting Humira.     Leaking seems to be occurring most from underside of stoma and toward midline.    Stoma requires convexity for proper seal though convexity likely contributing to discomfort, need to try to limit convexity as much as able.  Wound is necrotic and will need autolytic debridement since sharp debridement will exacerbate PG.     Attemped a Cam Karya pouch with a Coloplast barrier but it leaked due to rigid ring too close to pts ribs and small waistline as well as pt getting her hands into the pouching procedure.  This was removed and not attempted again.      Today pt would NOT let this WOC crust the denuded areas as she states it is TOO painful.  Pt was pouched with a coloplast barrier wafer cut large enough to encompass the stoma but cover the wound.  Aquacel AG was placed in the wound.   Marlan soft convex pouch cut to size of the barrier opening for some convexity but not too rigid.  Coloplast elastic barrier strips applied around the pouch.  Belly button filled with coloplast paste strip  to flatten the area and then covered with mepilex tape.  Belt applied and snugged up tight.      Pt provided with 3 pouch changes ( 1/day until Monday) and will only change if it leakes.  Did not cement the appliance on as it would be more trauma to the periwound skin to remove it all.        Pt Instructions Provided and Reviewed:  Placed today:     Sore skin dusted with powder and no-sting skin barrier.  Wound filled with Aquacel Ag in 2 layers.      Coloplast Brava Protective barrier sheet cut to fit stoma per pattern and placed ALL around stoma.  Coloplast Sensura Lance convex light pouch or the Marlan convex pouch placed over this with same opening as per pattern on the Protective barrier sheet.  I do expect you will need to change daily.        Face to face time approximately 50minutes.  Bettye Elizondo RN OCN      [phone]

## 2017-11-27 NOTE — PROGRESS NOTES
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  Pt states the pouch applied on Friday afternoon in clinic leaked Saturday morning. Has diprolene cream and is still waiting on Humira approval, initially was denied and appeal letter sent last week.    History from Friday visit - Pt states she is taking Senakot if her stool thickens because she feels the thicker stool \"pushes the pouch off\" and causes leakage.  In terms of her Humara and topical steroid prescriptions she states she is still waiting for the Humara to be approved by insurance and state the topical steroid cream has been approved and \"is in the mail\".       Patient was referred by MICAELA Roy.      HPI from Initial Visit:  Pt as diagnosed with UC and had colectomy in '09 with temporary ileostomy then a J Pouch.  She had issues with the J Pouch for 7 years eventually deciding to have a take down of this with ileostomy this past July.  Per pt, she has has pouching issues since this start but this eventually was under control and down to a 2-3 day wear time until more recently when she developed PG about 4 weeks ago.  Pt is not sure if a biopsy was done but states they did a \"scraping\" initially and an oral steriod for a week plus local steriod injections then it got worse. She is now needing to replace pouches 2-3 times per day.  Pt is looking to establish care here as due to moving will no longer be going to [facility].  She is seeing Dr. Belle in dermatology tomorrow for treatment as well.  Patient is wearing a ConvaTec Esteem Flex Convex pouch [id number] cut to fit up to 35mm with belt.  Has tried rings but seemed possibly worse with rings.  Has been using hydrofera blue over the wound but having issues with this slipping out and into the pouch. Do note that though stoma is oval with below noted dimensions pt is cutting a round opening that does not adequately fit her " "stoma.      Co-morbidities include anxiety.  She also states she saw a GI specialist in [place] on November 1st due to \"problems with my bottom\" and was scoped.  Concern was for Crohn's though this provider did not think this was the case and per pt he thought is was more of a \"diverson colitis\".     Pouching tried so far:    1.) Aquacel Ag to wound followed by a piece of Coloplast Brava protective sheet to hold it in place. Shana cut to fit convex pouch #24545 for softer convexity with Cam convex oval ring #[id number] and belt.  This did hold with no leaks when removed following day at dermatology appointment though was very comfortable for pt - too much pressure  2.) Moldable flat convatec 038522 barrier and pouch with a Cam convex oval barrier ring #[id number] with Aquacel Ag in the wound and 1/3rd of a Coloplast Protective barrier sheet over wound to secure the Aquacel Ag.  When initially placed in dermatology clinic on 11/14/17 pt states lasted 2 days though pt has tried again since and leaked is less than one day  3.) Moldable flat convatec barrier  [id number] and pouch with a Cam convex oval barrier ring #02972 added skin bond cement and used Aquacel ag in wound with 1/2 of a 4 inch Adapt ring over superior peristomal wound area in place of the Coloplast barrier sheet to try as more skin friendly (skin bond cement on barrier ring except wound on on barrier/convex ring).  Leaking daily.   4.)denudement was crusted with adapt powder and no-ting barrier then tried more flexible light convex oval shaped system, (Coloplast Sensura Lance Soft Convex #07289 cut to fit stoma,) keeping more flush to abd and used a Coloplast Protective barrier sheet around stoma, (opening cut to fit stoma,) rather than just above.  This leaked by evening the day it was applied.  5.)  coloplast barrier wafer cut large enough to encompass the stoma but cover the wound.  Aquacel AG was placed in the wound.   Marlan soft convex pouch " "cut to size of the barrier opening for some convexity but not too rigid.  Coloplast elastic barrier strips applied around the pouch.  Belly button filled with coloplast paste strip to flatten the area and then covered with mepilex tape.  Belt applied and snugged up tight. Leaked in less than 24 hours    Objective: Patient's ileostomy is located in the right upper quadrant.  She cannot even get 24 hours out of a pouching system.   The pouch she has in place she last evening and it currently has a seal.  States she last took senakot on Saturday, this is to keep her stool from getting \"to thick\" so with removal of the pouch we are fighting constant liquid output. States she tried the Ilesorb, this helps some but she has to empty so frequently that she uses a lot. Pt states with last pouch change last evening she used the Diprolene cream, aquacel ag, Coloplast protective barrier wafer over wound with hole cut out for stoma (with cement), then Shana convex cut to fit and belt as well as a bit of strip paste extending from naval toward but not up to stoma.  With removal this had a pretty good seal with some absorption of liquid stool into barrier circumferentially up to 5-8mm and Aquacel Ag was slipping out of wound.  Did have a few small blisters forming superior to stoma and wound that are suspicious for tape stripping injury.    Output: Loose stool, watery today  Stoma color: pink  Viability: good  Os/Lumen: at apex, is a loop stoma with stool loop having good profile though does tip laterally, remainder of stoma is flush  Tone: good  Profile: minimal and when sitting and especially standing does draw into soft abd more  Shape: oval  MCJ: attached, denudement along MCJ inferiorly.  Noting what may be some early PG breakdown beginning at MC junction at about 2:00, 7x3mm  Stoma Size: 7/8 x 1 1/4 to 1 3/8  Peristomal Skin: there is a large full thickness wound superior and right up to stoma with no skin bridge to " separate.  The wound is 100% soft brown necrotic tissue.  Some probable tape stripping superior to wound.  Abd: Pt has a very soft abd around the stoma despite being very thin with narrow waist and there is a lot of creasing and loose skin in area with sitting.  Waist is very small so stoma is close to navel and tends to leak toward navel  Pain: intense pain with touch all around stoma but especially at wound and near MC junction at 7-9:00     Wound #1 Pyoderma wound above stoma  Specific Dimensions (length x width x depth, in cm) :  2cm x 2.8cmx up to 0.4cm estimated  Tunneling: none noted  Undermining: none  Wound Base: 100% slough  Palpation of the wound bed: unable to palpate due to pain  Periwound Skin: some denudement distal and laterally from leaking stool  Temperature: WNL  Drainage: moderate  Odor: no  Pain:  intense          Assessment:  Ileostomy with already complicated pouching due to soft creasing abd, oval loop stoma with minimal profile and now extremely complicated due to large full thickness necrotic and painful PG ulceration next to stoma, unable to get more than a 24 hour wear and even 24 hours is difficult, Leaking seems to be occurring most from underside of stoma and toward midline.     Only treating PG with topical diprolene, waiting on Humira - This has been over 2 weeks now.      Pt is in need of Medical management of the underlying pyoderma.   Pt needs to quit taking stool softeners and adjust her diet to get stool to thicken up.       Pt is very shakey with her hands which makes pouching difficult for her to do well, states hard for her to see underside of stoma.     Plan:  Diprolene cream to be applied to lesion daily under the Aquacel Ag and may further complicate pouching since is a cream in addition to presenting the problem of absolutely needing to remove pouch every day to apply, cannot seem to achieve more than a days wear time anyway.   Awaiting Humira. Did speak with Dr. Belle  prior to appointment and per him he could put pt on Cyclosporine or prednisone but pt did not want to be on these meds.  Pt stated Prednisone/steriods have been ineffective for her and she is willing to consider cyclosporine but want to know if it will mean the Humira would then be denied.  Will message Dr. Belle regarding.    Stoma requires convexity for proper seal though convexity likely contributing to discomfort, need to try to limit convexity as much as able.  Wound is necrotic and will need autolytic debridement since sharp debridement will exacerbate PG.     Marlan soft convex pouch with coloplast barrier and aquacel Ag in wound with belt, strip paste in navel and cement with fair seal since last evening but all this adhesive removal is causing tape stripping.   Question if addition to a barrier ring, (coloplast 4mm,) this may have worked better.      For today - had 3 samples on hand of a Nu-Hope flat one piece cut to fit oval 1 1/2 to 2 3/4 #1108, this oval shape appears to be a better fit for her abd space and flexibility of this pouch is similar to the Shana cut to fit convex which she feels most comfortable in.  This was placed with a Cam convex barrier ring #[id number] after crusting peristomal skin then placement of a coloplast brava protective barrier sheet cut to fit over wound and around stoma.  Did not use cement this time due to skin stripping injuries that are occurring.  Did place 3 layers of aquacel Ag into the wound with diprolene cream worked into the first layer of Aquacel.  Belt applied and snugged up tight.        If this is NOT successful, plan to try Shana pouch WITH an additional 4mm barrier ring and at this time I will place an order for pt for a box of Marlex cut to fit pouches and coloplast protective barrier sheet and will keep working with this system to try to get a reliable 24 hour wear until the PG is under control.  Will need a letter of medical necessity for daily pouch  changes.      Pt provided with 1 more chelle and has a coloplast wafer in case of need for change and was instructed to follow-up tomorrow in clinic.      Spent time discussing with her need to NOT use Senakot and to work on intake of foods which thicken stool with suggestions made.  Pt verbalized understanding.        Face to face time approximately 50minutes.  Destini Knott RN, CWOCN       [phone]

## 2017-11-27 NOTE — TELEPHONE ENCOUNTER
Yes please sned her a copy of the appeal letter      It would be high dose steroids with the addition of cyclosporin then we would try to get things under control and once it started healing we would try to get off of meds

## 2017-11-27 NOTE — TELEPHONE ENCOUNTER
Appeal has been denied.        [facility] Prior Authorization Team   Phone: [phone]  Fax: [phone]

## 2017-11-27 NOTE — TELEPHONE ENCOUNTER
"Spoke to patient who stated:     \"Steroids aren't an option. I have been on them a lot for years and I don't really see results from them anymore. 1.)How long would I wait to see results if I am the Cyclosporine? 2.) Is it going to cure it?...\"    \"I use the pharmacy there but I would like to have those 2 questions answered before I make a decision on what to try... I use the pharmacy there at [place]..\"    \"I also am wondering if Dr. Belle stated in the appeal letter that I have has Chron's disease for 16 years now?\" (advised yes-in 1st paragraph of appeal letter)    Please advise. Penny Souza RN         "

## 2017-11-27 NOTE — TELEPHONE ENCOUNTER
Appeal has been denied.        [facility] Prior Authorization Team   Phone: [phone]  Fax: [phone]

## 2017-11-27 NOTE — TELEPHONE ENCOUNTER
Per Dr. Belle:    Please let patient know.    She will need to be on oral steroids and perhaps another med, she [facility] to [facility].    Please let her know this would be the next best options and ask how she would like to proceed.    Penny Souza RN

## 2017-11-27 NOTE — TELEPHONE ENCOUNTER
Patient notified. Patient verbalized understanding.     She would like to use [facility] pharmacy and is willing to try both steroids and Cyclosporin.     Penny Souza RN

## 2017-11-27 NOTE — TELEPHONE ENCOUNTER
Please let patient know    She will need to be on oral steroids and perhaps another med, [facility] to [facility].    Please let her know this would be the next best options and ask how she would like to proceed

## 2017-11-27 NOTE — ADDENDUM NOTE
Encounter addended by: Bettye Elizondo RN on: 11/27/2017  9:57 AM<BR>     Actions taken: Chief Complaint modified, Episode edited, Sign clinical note, Charge Capture section accepted

## 2017-11-28 ENCOUNTER — HOSPITAL ENCOUNTER (OUTPATIENT)
Dept: WOUND CARE | Facility: CLINIC | Age: 42
Discharge: HOME | End: 2017-11-28
Attending: SURGERY | Admitting: SURGERY
Payer: MEDICAID

## 2017-11-28 DIAGNOSIS — L88 PYODERMA GANGRENOSUM (H): Primary | ICD-10-CM

## 2017-11-28 DIAGNOSIS — S31.109D OPEN WOUND OF ANTERIOR ABDOMINAL WALL, SUBSEQUENT ENCOUNTER: ICD-10-CM

## 2017-11-28 DIAGNOSIS — Z93.2 ILEOSTOMY STATUS (H): Primary | ICD-10-CM

## 2017-11-28 DIAGNOSIS — L88 PYODERMA GANGRENOSA (H): ICD-10-CM

## 2017-11-28 PROCEDURE — 99214 OFFICE O/P EST MOD 30 MIN: CPT

## 2017-11-28 RX ORDER — PREDNISONE 20 MG/1
60 TABLET ORAL DAILY
Qty: 90 TABLET | Refills: 3 | Status: SHIPPED | OUTPATIENT
Start: 2017-11-28

## 2017-11-28 RX ORDER — CYCLOSPORINE 100 MG/1
100 CAPSULE, GELATIN COATED ORAL 2 TIMES DAILY
Qty: 60 CAPSULE | Refills: 3 | Status: SHIPPED | OUTPATIENT
Start: 2017-11-28 | End: 2018-01-03

## 2017-11-28 NOTE — LETTER
2017    INSURER: Pay[place] / Product Type: Medicaid /   Patient: Yuni Lam  Policy ID#:  [id number]  : 1975      To Whom it May Concern:    I am writing to formally request coverage for my patient,  Yuni Lam, for excess ostomy supplies.      Yuni Lam has been treated by dermatology and ostomy care nursing since 15 for her ileostomy with peristoma pyoderma gangrenosa, (PG).  Due to PG, she has a wound is that is very painful and large located under her ostomy pouch right next to her stoma.  Due to wound it has been impossible to retain on ostomy pouch more than 24 hours and many do not even last this long.  In addition, she is now needing to treat this wound with a topical medication that needs to be applied daily.         I have included medical records pertaining to the patient s medical history, current condition and treatment plan. I firmly believe that these supplies are absolutely necessary for adequate care including wound care, containment of caustic stool to protect skin and overall quality of life.     Please contact wound and ostomy care at [phone] if you require additional information to ensure the prompt approval for coverage.    Please send your written decision to me at this address:  [facility]  [address] [id number] [phone]  Dept: [phone]  E-mail: [email or web address]      Sincerely,      Destini Knott RN, CWOCN         Dr. Belle, dermatology

## 2017-11-28 NOTE — DISCHARGE INSTRUCTIONS
Continue with Shana pouch #14985, cut according to template and apply the Coloplast barrier sheet to the back of the pouch with same template cut, (sticking side to go to skin,) stretch out 1/2 of a Coloplast Brava Protective seal so thin, (no more than 1cm wide all the way around the opening,) of the cut edge and press in place.  This will use just under 1/2 of a seal.  Use the left over seal to apply in the crease directed toward your navel.      If this leaks, try sandwiching the ring in between the Coloplast barrier sheet and the Shana pouch, this will add some convexity but prevent the seal from absorbing as much moisture.    Prepare the wound by irrigating with saline, dry as well as you are able.  Prepare any irritated skin with powder and no-sting barrier as able.      Fill wound with about 3 layers of Aquacel Ag, working some betamethasone into the first layer so in contact with wound.    Place pouch as smoothly and evenly as possible.  Change daily.    Follow-up in one week.    Talk primary care about pills, monitor which ones are coming through un-dissolved, enteric coated and extended release pills should be avoided with an ileostomy.    Continue to NOT take Senakot.  Try to eat foods that thicken stool, (pasta, white bread, peanut butter, cheese, etc)    Destini Knott RN, CWOCN [phone]

## 2017-11-28 NOTE — TELEPHONE ENCOUNTER
Spoke to patient and she does not have a [employer] membership, she did get message about Humira not being covered yesterday. I advised of rxs and she will pick them up at pharmacy. I scheduled a one week follow up appt and left the 12-12-17 appt as scheduled in case she needs to be seen again 1 week later. Patient verbalized understanding. Penny Souza RN

## 2017-11-28 NOTE — IP AVS SNAPSHOT
MRN:[id number]                      After Visit Summary   11/28/2017    Yuni Lam    MRN: [id number]           Visit Information        Provider Department      11/28/2017  3:00 PM Sky [facility]Aegre Nurse - [facility]           Review of your medicines      UNREVIEWED medicines. Ask your doctor about these medicines        Dose / Directions    adalimumab 40 MG/0.8ML prefilled syringe kit   Commonly known as:  HUMIRA   Used for:  Pyoderma gangrenosum        80 mg Day 1 and Day 2; 80 mg on Day 15; (Day 29), 40 mg every other week   Quantity:  6 kit   Refills:  3       augmented betamethasone dipropionate 0.05 % cream   Commonly known as:  DIPROLENE-AF   Used for:  Pyoderma gangrenosum        Apply sparingly to affected area twice daily as needed.  Do not apply to face.   Quantity:  100 g   Refills:  3       calcium carbonate-vitamin D 500-400 MG-UNIT Tabs per tablet   Used for:  Pyoderma gangrenosum        Dose:  1 tablet   Take 1 tablet by mouth 2 times daily   Quantity:  180 tablet   Refills:  3       * CAMPRAL PO        Take by mouth 3 times daily   Refills:  0       * acamprosate 333 MG EC tablet   Commonly known as:  CAMPRAL   Used for:  Alcohol dependence in remission (H)        Dose:  666 mg   Take 2 tablets (666 mg) by mouth 3 times daily   Quantity:  180 tablet   Refills:  2       cycloSPORINE 100 MG capsule   Commonly known as:  sandIMMUNE   Used for:  Pyoderma gangrenosum        Dose:  100 mg   Take 1 capsule (100 mg) by mouth 2 times daily   Quantity:  60 capsule   Refills:  3       FUROSEMIDE PO        Dose:  20 mg   Take 20 mg by mouth daily   Refills:  0       gabapentin 600 MG tablet   Commonly known as:  NEURONTIN   Used for:  Chronic pain syndrome        Dose:  1200 mg   Take 2 tablets (1,200 mg) by mouth 3 times daily   Quantity:  180 tablet   Refills:  1       hydrOXYzine 25 MG tablet   Commonly known as:  ATARAX   Used for:  Anxiety        Dose:  25-50 mg    Take 1-2 tablets (25-50 mg) by mouth 3 times daily as needed for anxiety   Quantity:  90 tablet   Refills:  2       IRON SUPPLEMENT PO        Dose:  325 mg   Take 325 mg by mouth daily   Refills:  0       nicotine polacrilex 2 MG gum   Commonly known as:  [employer] NICOTINE POLACRILEX   Used for:  Tobacco abuse        Dose:  2 mg   Place 1 each (2 mg) inside cheek every 2 hours as needed for smoking cessation   Quantity:  100 each   Refills:  1       PANTOPRAZOLE SODIUM PO        Dose:  40 mg   Take 40 mg by mouth daily   Refills:  0       predniSONE 20 MG tablet   Commonly known as:  DELTASONE   Used for:  Pyoderma gangrenosum        Dose:  60 mg   Take 3 tablets (60 mg) by mouth daily   Quantity:  90 tablet   Refills:  3       sertraline 100 MG tablet   Commonly known as:  ZOLOFT   Used for:  Anxiety        Dose:  100 mg   Take 1 tablet (100 mg) by mouth daily   Quantity:  30 tablet   Refills:  2       traMADol 50 MG tablet   Commonly known as:  ULTRAM   Used for:  Chronic pain syndrome        Dose:  50 mg   Take 1 tablet (50 mg) by mouth every 6 hours as needed for pain maximum 3-4 tablet(s) per day   Quantity:  90 tablet   Refills:  0       traZODone 50 MG tablet   Commonly known as:  DESYREL   Used for:  Insomnia, unspecified type        Dose:  150 mg   Take 3 tablets (150 mg) by mouth At Bedtime   Quantity:  90 tablet   Refills:  2       VITAMIN D (CHOLECALCIFEROL) PO        Dose:  1000 Units   Take 1,000 Units by mouth daily   Refills:  0       * Notice:  This list has 2 medication(s) that are the same as other medications prescribed for you. Read the directions carefully, and ask your doctor or other care provider to review them with you.             Protect others around you: Learn how to safely use, store and throw away your medicines at www.disposemymeds.org.         Follow-ups after your visit        Your next 10 appointments already scheduled     Dec 04, 2017  1:00 PM CST   PHYSICAL with Jonelle Montoya  "MICAELA Pelayo CNP   [facility]    [address] [address]   [phone]            Dec 04, 2017  2:30 PM CST   Office Visit with Et Nurse - [facility] [place] [facility] ([facility])    [address] [address]   [phone]           Bring a current list of meds and any records pertaining to this visit. For Physicals, please bring immunization records and any forms needing to be filled out. Please arrive 10 minutes early to complete paperwork.            Dec 05, 2017  2:15 PM CST   [facility] Alejandro Belle MD   [facility]    [address] [address]   [phone]            Dec 12, 2017  2:00 PM CST   [facility] Visit with Alejandro Belle MD   [facility]    [address] [address]   [phone]            Dec 16, 2017 10:30 AM CST   MA SCREENING DIGITAL BILATERAL with [address] [facility] ([facility])    [address] [address]   [phone]           Do not use any powder, lotion or deodorant under your arms or on your breast. If you do, we will ask you to remove it before your exam.  Wear comfortable, two-piece clothing.  If you have any allergies, tell your care team.  Bring any previous mammograms from other facilities or have them mailed to the breast center. Three-dimensional (3D) mammograms are available at [facility] locations in [facility], [place], [place], [facility], [place], [place], and [place]. -[facility] locations include [place] and [facility] in [place]. Benefits of 3D mammograms include: - Improved rate of cancer detection - Decreases your chance of having to go back for more tests, which means fewer: - \"False-positive\" results (This means that there is an abnormal " area but it isn't cancer.) - Invasive testing procedures, such as a biopsy or surgery - Can provide clearer images of the breast if you have dense breast tissue. 3D mammography is an optional exam that anyone can have with a 2D mammogram. It doesn't replace or take the place of a 2D mammogram. 2D mammograms remain an effective screening test for all women.  Not all insurance companies cover the cost of a 3D mammogram. Check with your insurance.               Care Instructions        Further instructions from your care team       Continue with Shana pouch #01124, cut according to template and apply the Coloplast barrier sheet to the back of the pouch with same template cut, (sticking side to go to skin,) stretch out 1/2 of a Coloplast Brava Protective seal so thin, (no more than 1cm wide all the way around the opening,) of the cut edge and press in place.  This will use just under 1/2 of a seal.  Use the left over seal to apply in the crease directed toward your navel.      If this leaks, try sandwiching the ring in between the Coloplast barrier sheet and the Shana pouch, this will add some convexity but prevent the seal from absorbing as much moisture.    Prepare the wound by irrigating with saline, dry as well as you are able.  Prepare any irritated skin with powder and no-sting barrier as able.      Fill wound with about 3 layers of Aquacel Ag, working some betamethasone into the first layer so in contact with wound.    Place pouch as smoothly and evenly as possible.  Change daily.    Follow-up in one week.    Talk primary care about pills, monitor which ones are coming through un-dissolved, enteric coated and extended release pills should be avoided with an ileostomy.    Continue to NOT take Senakot.  Try to eat foods that thicken stool, (pasta, white bread, peanut butter, cheese, etc)    Destini Knott RN, CWOCN [phone]     Additional Information About Your Visit        MyChart Information     MyChart  gives you secure access to your electronic health record. If you see a primary care provider, you can also send messages to your care team and make appointments. If you have questions, please call your primary care clinic.  If you do not have a primary care provider, please call [phone] and they will assist you.        Care EveryWhere ID     This is your Care EveryWhere ID. This could be used by other organizations to access your [facility] medical records  [id number]         Primary Care Provider Office Phone # Fax #    Jonelle Montoya Valedemetrice, MICAELA [phone]      Equal Access to Services     [facility] : Hadii [place], waquintin gonzalez, yuan kaalmapooja grider, maribel huertas . So [facility] [phone].    ATENCIÓN: Si habla español, tiene a stephen disposición servicios gratuitos de asistencia lingüística. Llame al [phone].    We comply with applicable federal civil rights laws and [place] laws. We do not discriminate on the basis of race, color, national origin, age, disability, sex, sexual orientation, or gender identity.            Thank you!     Thank you for choosing [facility] for your care. Our goal is always to provide you with excellent care. Hearing back from our patients is one way we can continue to improve our services. Please take a few minutes to complete the written survey that you may receive in the mail after you visit with us. Thank you!             Medication List: This is a list of all your medications and when to take them. Check marks below indicate your daily home schedule. Keep this list as a reference.      Medications           Morning Afternoon Evening Bedtime As Needed    adalimumab 40 MG/0.8ML prefilled syringe kit   Commonly known as:  HUMIRA   80 mg Day 1 and Day 2; 80 mg on Day 15; (Day 29), 40 mg every other week                                augmented betamethasone dipropionate 0.05 % cream   Commonly known as:   DIPROLENE-AF   Apply sparingly to affected area twice daily as needed.  Do not apply to face.                                calcium carbonate-vitamin D 500-400 MG-UNIT Tabs per tablet   Take 1 tablet by mouth 2 times daily                                * CAMPRAL PO   Take by mouth 3 times daily                                * acamprosate 333 MG EC tablet   Commonly known as:  CAMPRAL   Take 2 tablets (666 mg) by mouth 3 times daily                                cycloSPORINE 100 MG capsule   Commonly known as:  sandIMMUNE   Take 1 capsule (100 mg) by mouth 2 times daily                                FUROSEMIDE PO   Take 20 mg by mouth daily                                gabapentin 600 MG tablet   Commonly known as:  NEURONTIN   Take 2 tablets (1,200 mg) by mouth 3 times daily                                hydrOXYzine 25 MG tablet   Commonly known as:  ATARAX   Take 1-2 tablets (25-50 mg) by mouth 3 times daily as needed for anxiety                                IRON SUPPLEMENT PO   Take 325 mg by mouth daily                                nicotine polacrilex 2 MG gum   Commonly known as:  [employer] NICOTINE POLACRILEX   Place 1 each (2 mg) inside cheek every 2 hours as needed for smoking cessation                                PANTOPRAZOLE SODIUM PO   Take 40 mg by mouth daily                                predniSONE 20 MG tablet   Commonly known as:  DELTASONE   Take 3 tablets (60 mg) by mouth daily                                sertraline 100 MG tablet   Commonly known as:  ZOLOFT   Take 1 tablet (100 mg) by mouth daily                                traMADol 50 MG tablet   Commonly known as:  ULTRAM   Take 1 tablet (50 mg) by mouth every 6 hours as needed for pain maximum 3-4 tablet(s) per day                                traZODone 50 MG tablet   Commonly known as:  DESYREL   Take 3 tablets (150 mg) by mouth At Bedtime                                VITAMIN D (CHOLECALCIFEROL) PO   Take 1,000 Units by mouth  daily                                * Notice:  This list has 2 medication(s) that are the same as other medications prescribed for you. Read the directions carefully, and ask your doctor or other care provider to review them with you.

## 2017-11-28 NOTE — TELEPHONE ENCOUNTER
Per Dr. Belle:    Please let patient know that humira was denied even after appeal.     I have sent a Rx to pharmacy for prednisone, calcium and vit d as well as cyclosporin.     Nothing is covered per EPIC.......Is she a [employer] member sometimes they have cheap meds.       She will need a follow-up in one week once she she starts these meds.       Please let her know and make her an appt.

## 2017-11-29 ENCOUNTER — TELEPHONE (OUTPATIENT)
Dept: DERMATOLOGY | Facility: CLINIC | Age: 42
End: 2017-11-29

## 2017-11-29 DIAGNOSIS — L88 PYODERMA GANGRENOSA (H): Primary | ICD-10-CM

## 2017-11-29 NOTE — TELEPHONE ENCOUNTER
"Reason for Call:  Other prescription    Detailed comments: [facility] pharmacy faxed request stating \" no calcium 500-400 available that is covered by pt ins. Could we sub 500-200 or 600-400?\"    Phone Number Patient can be reached at: [phone]    Best Time: any     Can we leave a detailed message on this number? YES    Call taken on 11/29/2017 at 7:32 AM by Zina Paula       "

## 2017-11-29 NOTE — PROGRESS NOTES
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  Pt states the pouch applied yesterday morning in clinic leaked at 4 in the morning and it took her 3 hours to get a pouch back on because she has so much output. Humira approval was denied, Dr. Belle has ordered prednisone, cyclosporine and calcium and vitamin D.  She has not picked this up yet, per Dr. Belle's office there may be issues with coverage for this also.     History from Friday visit - Pt states she is taking Senakot if her stool thickens because she feels the thicker stool \"pushes the pouch off\" and causes leakage.  In terms of her Humara and topical steroid prescriptions she states she is still waiting for the Humara to be approved by insurance and state the topical steroid cream has been approved and \"is in the mail\".       Patient was referred by MICAELA Roy.      HPI from Initial Visit:  Pt as diagnosed with UC and had colectomy in '09 with temporary ileostomy then a J Pouch.  She had issues with the J Pouch for 7 years eventually deciding to have a take down of this with ileostomy this past July.  Per pt, she has has pouching issues since this start but this eventually was under control and down to a 2-3 day wear time until more recently when she developed PG about 4 weeks ago.  Pt is not sure if a biopsy was done but states they did a \"scraping\" initially and an oral steriod for a week plus local steriod injections then it got worse. She is now needing to replace pouches 2-3 times per day.  Pt is looking to establish care here as due to moving will no longer be going to [facility].  She is seeing Dr. Belle in dermatology tomorrow for treatment as well.  Patient is wearing a ConvaTec Esteem Flex Convex pouch [id number] cut to fit up to 35mm with belt.  Has tried rings but seemed possibly worse with rings.  Has been using hydrofera blue over the wound but having issues " "with this slipping out and into the pouch. Do note that though stoma is oval with below noted dimensions pt is cutting a round opening that does not adequately fit her stoma.      Co-morbidities include anxiety.  She also states she saw a GI specialist in [place] on November 1st due to \"problems with my bottom\" and was scoped.  Concern was for Crohn's though this provider did not think this was the case and per pt he thought is was more of a \"diverson colitis\".     Pouching tried so far:    1.) Aquacel Ag to wound followed by a piece of Coloplast Brava protective sheet to hold it in place. Shana cut to fit convex pouch #[id number] for softer convexity with Cam convex oval ring #[id number] and belt.  This did hold with no leaks when removed following day at dermatology appointment though was very comfortable for pt - too much pressure  2.) Moldable flat convatec [id number] barrier and pouch with a [place] convex oval barrier ring #[id number] with Aquacel Ag in the wound and 1/3rd of a Coloplast Protective barrier sheet over wound to secure the Aquacel Ag.  When initially placed in dermatology clinic on 11/14/17 pt states lasted 2 days though pt has tried again since and leaked is less than one day  3.) Moldable flat convatec barrier  [id number] and pouch with a Cam convex oval barrier ring #[id number] added skin bond cement and used Aquacel ag in wound with 1/2 of a 4 inch Adapt ring over superior peristomal wound area in place of the Coloplast barrier sheet to try as more skin friendly (skin bond cement on barrier ring except wound on on barrier/convex ring).  Leaking daily.   4.)denudement was crusted with adapt powder and no-ting barrier then tried more flexible light convex oval shaped system, (Coloplast Sensura Lance Soft Convex #[id number] cut to fit stoma,) keeping more flush to abd and used a Coloplast Protective barrier sheet around stoma, (opening cut to fit stoma,) rather than just above.  This leaked by evening the day it " was applied.  5.)  coloplast barrier wafer cut large enough to encompass the stoma but cover the wound.  Aquacel AG was placed in the wound.   Marlan soft convex pouch cut to size of the barrier opening for some convexity but not too rigid.  Coloplast elastic barrier strips applied around the pouch.  Belly button filled with coloplast paste strip to flatten the area and then covered with mepilex tape.  Belt applied and snugged up tight. Leaked in less than 24 hours  6.) Nu-Datorama flat one piece cut to fit oval 1 1/2 to 2 3/4 #1108, this oval shape appears to be a better fit for her abd space and flexibility of this pouch is similar to the Shana cut to fit convex which she feels most comfortable in.  This was placed with a Cam convex barrier ring #[id number] after crusting peristomal skin then placement of a coloplast brava protective barrier sheet cut to fit over wound and around stoma, strip paste to belly button.  Leaked in less than 24 hours    Objective: Patient's ileostomy is located in the right upper quadrant.  She cannot even get 24 hours out of a pouching system.   The pouch she has in place was placed by about 7am and currently has a seal, with removal there is about 5mm of moisture absorbed into barrier and Aquacel Ag is saturated.        Output: stool is thicker today, oatmeal consistency.  Noted a pill in her pouch, she states she has noticed at least 4 that seem to come though intact.  One of her medications is noted to be enteric coated, (Campral,) and suspect iron may be as well.    Stoma color: pink  Viability: good  Os/Lumen: at apex, is a loop stoma with stool loop having good profile though does tip laterally, remainder of stoma is flush  Tone: flaccid at time and stoma becomes concave at times causing stool to well up in area  Profile: minimal and when sitting and especially standing does draw into soft abd more  Shape: oval  MCJ: attached, denudement along MCJ inferiorly.  Noting what may be  some early PG breakdown beginning at MC junction at about 2:00, 7x3mm  Stoma Size: 7/8 x 1 1/4 to 1 3/8  Peristomal Skin: there is a large full thickness wound superior and right up to stoma with no skin bridge to separate.  The wound is 100% soft brown necrotic tissue.  Some probable tape stripping superior to wound, just pink today and intact.  Abd: Pt has a very soft abd around the stoma despite being very thin with narrow waist and there is a lot of creasing and loose skin in area with sitting.  Waist is very small so stoma is close to navel and tends to leak toward navel  Pain: intense pain with touch all around stoma but especially at wound and near MC junction at 7-9:00     Wound #1 Pyoderma wound above stoma  Specific Dimensions (length x width x depth, in cm) :  2cm x 2.8cmx up to 0.4cm estimated  Tunneling: none noted  Undermining: some beginning now on medial side as wound is worsening as not being actively treated for PG other than by a topical product  Wound Base: 100% slough  Palpation of the wound bed: unable to palpate due to pain  Periwound Skin: some denudement distal and laterally from leaking stool  Temperature: WNL  Drainage: moderate  Odor: no  Pain:  intense          Assessment:  Ileostomy with already complicated pouching due to soft creasing abd, oval loop stoma with minimal profile and now extremely complicated due to large full thickness necrotic and painful PG ulceration next to stoma, unable to get more than a 24 hour wear and even 24 hours is difficult, Leaking seems to be occurring most from underside of stoma and toward midline.     Only treating PG with topical diprolene, to start prednisone and cyclosporine hopefully today      Pt is in need of Medical management of the underlying pyoderma.   Pt needs to quit taking stool softeners and adjust her diet to get stool to thicken up.       Needs to talk with primary provider and pharmacy for medication alternatives to enteric  coated/better absorbed meds    Pt is very shakey with her hands which makes pouching difficult for her to do well, states hard for her to see underside of stoma.     Plan:  Diprolene cream to be applied to lesion daily under the Aquacel Ag and may further complicate pouching since is a cream in addition to presenting the problem of absolutely needing to remove pouch every day to apply, cannot seem to achieve more than a days wear time anyway.        Stoma requires convexity for proper seal though convexity likely contributing to discomfort, need to try to limit convexity as much as able.  Wound is necrotic and will need autolytic debridement since sharp debridement will exacerbate PG.     As is most comfortable and seems to provide best wear - continue Marlan soft convex pouch with coloplast protective barrier sheet and aquacel Ag in wound with belt, strip paste in navel.  Today added 1/2 of a coloplast 4.2mm brava protective seal #[id number] around the cut edge for slightly more convexity as unfortunately where she really needs the convexity is immediately around the stoma and with this cut to fit pouch we lose this, she cannot tolerate the pressure of the convex oval ring with this convex pouch over PG wound.      For today - Denudement was crusted then pouch applied as per pt instructions below    Pt instructions:  Continue with Shana pouch #[id number], cut according to template and apply the Coloplast barrier sheet to the back of the pouch with same template cut, (sticking side to go to skin,) stretch out 1/2 of a Coloplast Brava Protective seal so thin, (no more than 1cm wide all the way around the opening,) of the cut edge and press in place.  This will use just under 1/2 of a seal.  Use the left over seal to apply in the crease directed toward your navel.      If this leaks, try sandwiching the ring in between the Coloplast barrier sheet and the Shana pouch, this will add some convexity but prevent the seal from  absorbing as much moisture.    Prepare the wound by irrigating with saline, dry as well as you are able.  Prepare any irritated skin with powder and no-sting barrier as able.      Fill wound with about 3 layers of Aquacel Ag, working some betamethasone into the first layer so in contact with wound.    Place pouch as smoothly and evenly as possible.  Change daily.    Follow-up in one week.    Will need a letter of medical necessity for daily pouch changes.  Will order supplies from [employer]    Pt provided with 3 more marlens and 3 coloplast wafers, 2 rings      Face to face time approximately 50minutes.  Destini Knott RN, CWOCN       [phone]

## 2017-12-04 ENCOUNTER — OFFICE VISIT (OUTPATIENT)
Dept: FAMILY MEDICINE | Facility: CLINIC | Age: 42
End: 2017-12-04
Payer: MEDICAID

## 2017-12-04 ENCOUNTER — HOSPITAL ENCOUNTER (EMERGENCY)
Facility: CLINIC | Age: 42
Discharge: HOME | End: 2017-12-04
Attending: EMERGENCY MEDICINE | Admitting: EMERGENCY MEDICINE
Payer: MEDICAID

## 2017-12-04 ENCOUNTER — HOSPITAL ENCOUNTER (OUTPATIENT)
Dept: WOUND CARE | Facility: CLINIC | Age: 42
Discharge: HOME | End: 2017-12-04
Attending: SURGERY | Admitting: SURGERY
Payer: MEDICAID

## 2017-12-04 ENCOUNTER — TELEPHONE (OUTPATIENT)
Dept: PHARMACY | Facility: OTHER | Age: 42
End: 2017-12-04

## 2017-12-04 VITALS
HEART RATE: 75 BPM | TEMPERATURE: 98.1 F | OXYGEN SATURATION: 98 % | SYSTOLIC BLOOD PRESSURE: 110 MMHG | DIASTOLIC BLOOD PRESSURE: 62 MMHG | RESPIRATION RATE: 16 BRPM

## 2017-12-04 VITALS
SYSTOLIC BLOOD PRESSURE: 101 MMHG | WEIGHT: 99.3 LBS | HEIGHT: 63 IN | HEART RATE: 101 BPM | TEMPERATURE: 97.4 F | DIASTOLIC BLOOD PRESSURE: 73 MMHG | BODY MASS INDEX: 17.59 KG/M2

## 2017-12-04 DIAGNOSIS — R25.1 SHAKES: ICD-10-CM

## 2017-12-04 DIAGNOSIS — N17.9 ACUTE KIDNEY INJURY (H): ICD-10-CM

## 2017-12-04 DIAGNOSIS — T88.1XXA GUILLAIN-BARRE SYNDROME FOLLOWING VACCINATION (H): ICD-10-CM

## 2017-12-04 DIAGNOSIS — F19.982 DRUG INDUCED INSOMNIA (H): ICD-10-CM

## 2017-12-04 DIAGNOSIS — E05.90 HYPERTHYROIDISM: ICD-10-CM

## 2017-12-04 DIAGNOSIS — G61.0 GUILLAIN-BARRE SYNDROME FOLLOWING VACCINATION (H): ICD-10-CM

## 2017-12-04 DIAGNOSIS — Z00.00 ANNUAL PHYSICAL EXAM: Primary | ICD-10-CM

## 2017-12-04 DIAGNOSIS — D72.829 LEUKOCYTOSIS, UNSPECIFIED TYPE: ICD-10-CM

## 2017-12-04 LAB
ALBUMIN SERPL-MCNC: 3.9 G/DL (ref 3.4–5)
ALP SERPL-CCNC: 83 U/L (ref 40–150)
ALT SERPL W P-5'-P-CCNC: 25 U/L (ref 0–50)
ANION GAP SERPL CALCULATED.3IONS-SCNC: 10 MMOL/L (ref 3–14)
ANION GAP SERPL CALCULATED.3IONS-SCNC: 12 MMOL/L (ref 3–14)
AST SERPL W P-5'-P-CCNC: 21 U/L (ref 0–45)
BASOPHILS # BLD AUTO: 0 10E9/L (ref 0–0.2)
BASOPHILS # BLD AUTO: 0 10E9/L (ref 0–0.2)
BASOPHILS NFR BLD AUTO: 0 %
BASOPHILS NFR BLD AUTO: 0.1 %
BILIRUB SERPL-MCNC: 0.4 MG/DL (ref 0.2–1.3)
BUN SERPL-MCNC: 56 MG/DL (ref 7–30)
BUN SERPL-MCNC: 59 MG/DL (ref 7–30)
CALCIUM SERPL-MCNC: 8.7 MG/DL (ref 8.5–10.1)
CALCIUM SERPL-MCNC: 8.7 MG/DL (ref 8.5–10.1)
CHLORIDE SERPL-SCNC: 105 MMOL/L (ref 94–109)
CHLORIDE SERPL-SCNC: 107 MMOL/L (ref 94–109)
CO2 SERPL-SCNC: 17 MMOL/L (ref 20–32)
CO2 SERPL-SCNC: 17 MMOL/L (ref 20–32)
CREAT SERPL-MCNC: 2.5 MG/DL (ref 0.52–1.04)
CREAT SERPL-MCNC: 2.57 MG/DL (ref 0.52–1.04)
DIFFERENTIAL METHOD BLD: ABNORMAL
DIFFERENTIAL METHOD BLD: ABNORMAL
EOSINOPHIL # BLD AUTO: 0 10E9/L (ref 0–0.7)
EOSINOPHIL # BLD AUTO: 0 10E9/L (ref 0–0.7)
EOSINOPHIL NFR BLD AUTO: 0 %
EOSINOPHIL NFR BLD AUTO: 0.1 %
ERYTHROCYTE [DISTWIDTH] IN BLOOD BY AUTOMATED COUNT: 15.4 % (ref 10–15)
ERYTHROCYTE [DISTWIDTH] IN BLOOD BY AUTOMATED COUNT: 16 % (ref 10–15)
GFR SERPL CREATININE-BSD FRML MDRD: 20 ML/MIN/1.7M2
GFR SERPL CREATININE-BSD FRML MDRD: 21 ML/MIN/1.7M2
GLUCOSE SERPL-MCNC: 109 MG/DL (ref 70–99)
GLUCOSE SERPL-MCNC: 131 MG/DL (ref 70–99)
HCT VFR BLD AUTO: 38.8 % (ref 35–47)
HCT VFR BLD AUTO: 40.9 % (ref 35–47)
HGB BLD-MCNC: 12.7 G/DL (ref 11.7–15.7)
HGB BLD-MCNC: 13.6 G/DL (ref 11.7–15.7)
IMM GRANULOCYTES # BLD: 0.1 10E9/L (ref 0–0.4)
IMM GRANULOCYTES NFR BLD: 0.4 %
LYMPHOCYTES # BLD AUTO: 2.2 10E9/L (ref 0.8–5.3)
LYMPHOCYTES # BLD AUTO: 2.7 10E9/L (ref 0.8–5.3)
LYMPHOCYTES NFR BLD AUTO: 13.5 %
LYMPHOCYTES NFR BLD AUTO: 16.8 %
MCH RBC QN AUTO: 26.3 PG (ref 26.5–33)
MCH RBC QN AUTO: 26.5 PG (ref 26.5–33)
MCHC RBC AUTO-ENTMCNC: 32.7 G/DL (ref 31.5–36.5)
MCHC RBC AUTO-ENTMCNC: 33.3 G/DL (ref 31.5–36.5)
MCV RBC AUTO: 79 FL (ref 78–100)
MCV RBC AUTO: 81 FL (ref 78–100)
MONOCYTES # BLD AUTO: 0.6 10E9/L (ref 0–1.3)
MONOCYTES # BLD AUTO: 1.1 10E9/L (ref 0–1.3)
MONOCYTES NFR BLD AUTO: 3.8 %
MONOCYTES NFR BLD AUTO: 7 %
NEUTROPHILS # BLD AUTO: 12.2 10E9/L (ref 1.6–8.3)
NEUTROPHILS # BLD AUTO: 13.3 10E9/L (ref 1.6–8.3)
NEUTROPHILS NFR BLD AUTO: 76 %
NEUTROPHILS NFR BLD AUTO: 82.3 %
PLATELET # BLD AUTO: 235 10E9/L (ref 150–450)
PLATELET # BLD AUTO: 250 10E9/L (ref 150–450)
POTASSIUM SERPL-SCNC: 4.4 MMOL/L (ref 3.4–5.3)
POTASSIUM SERPL-SCNC: 4.5 MMOL/L (ref 3.4–5.3)
PROT SERPL-MCNC: 8.7 G/DL (ref 6.8–8.8)
RBC # BLD AUTO: 4.79 10E12/L (ref 3.8–5.2)
RBC # BLD AUTO: 5.18 10E12/L (ref 3.8–5.2)
SODIUM SERPL-SCNC: 134 MMOL/L (ref 133–144)
SODIUM SERPL-SCNC: 134 MMOL/L (ref 133–144)
T4 FREE SERPL-MCNC: 0.86 NG/DL (ref 0.76–1.46)
TSH SERPL DL<=0.005 MIU/L-ACNC: 0.07 MU/L (ref 0.4–4)
WBC # BLD AUTO: 16 10E9/L (ref 4–11)
WBC # BLD AUTO: 16.2 10E9/L (ref 4–11)

## 2017-12-04 PROCEDURE — 99396 PREV VISIT EST AGE 40-64: CPT | Performed by: NURSE PRACTITIONER

## 2017-12-04 PROCEDURE — 99284 EMERGENCY DEPT VISIT MOD MDM: CPT | Mod: Z6 | Performed by: EMERGENCY MEDICINE

## 2017-12-04 PROCEDURE — 96361 HYDRATE IV INFUSION ADD-ON: CPT | Performed by: EMERGENCY MEDICINE

## 2017-12-04 PROCEDURE — 96360 HYDRATION IV INFUSION INIT: CPT | Performed by: EMERGENCY MEDICINE

## 2017-12-04 PROCEDURE — 80053 COMPREHEN METABOLIC PANEL: CPT | Performed by: EMERGENCY MEDICINE

## 2017-12-04 PROCEDURE — 36415 COLL VENOUS BLD VENIPUNCTURE: CPT | Performed by: NURSE PRACTITIONER

## 2017-12-04 PROCEDURE — 25000128 H RX IP 250 OP 636: Performed by: EMERGENCY MEDICINE

## 2017-12-04 PROCEDURE — 80050 GENERAL HEALTH PANEL: CPT | Performed by: NURSE PRACTITIONER

## 2017-12-04 PROCEDURE — 85025 COMPLETE CBC W/AUTO DIFF WBC: CPT | Performed by: EMERGENCY MEDICINE

## 2017-12-04 PROCEDURE — 99283 EMERGENCY DEPT VISIT LOW MDM: CPT | Mod: 25 | Performed by: EMERGENCY MEDICINE

## 2017-12-04 RX ORDER — TEMAZEPAM 7.5 MG/1
7.5 CAPSULE ORAL
Qty: 30 CAPSULE | Refills: 0 | Status: SHIPPED | OUTPATIENT
Start: 2017-12-04 | End: 2017-12-18

## 2017-12-04 RX ORDER — AMOXICILLIN 250 MG
2 CAPSULE ORAL DAILY PRN
COMMUNITY
Start: 2017-07-31

## 2017-12-04 RX ORDER — NICOTINE 21 MG/24HR
1 PATCH, TRANSDERMAL 24 HOURS TRANSDERMAL DAILY PRN
COMMUNITY
Start: 2017-07-14

## 2017-12-04 RX ORDER — OMEGA-3S/DHA/EPA/FISH OIL 1000-1400
1 CAPSULE,DELAYED RELEASE (ENTERIC COATED) ORAL DAILY
COMMUNITY

## 2017-12-04 RX ORDER — CIPROFLOXACIN 500 MG/1
500 TABLET, FILM COATED ORAL 2 TIMES DAILY
COMMUNITY
Start: 2017-11-28 | End: 2017-12-12

## 2017-12-04 RX ORDER — PROPRANOLOL HYDROCHLORIDE 10 MG/1
10 TABLET ORAL 2 TIMES DAILY
Qty: 60 TABLET | Refills: 1 | Status: SHIPPED | OUTPATIENT
Start: 2017-12-04 | End: 2017-12-18

## 2017-12-04 RX ADMIN — SODIUM CHLORIDE 1000 ML: 9 INJECTION, SOLUTION INTRAVENOUS at 21:09

## 2017-12-04 RX ADMIN — SODIUM CHLORIDE 1000 ML: 9 INJECTION, SOLUTION INTRAVENOUS at 20:01

## 2017-12-04 NOTE — PATIENT INSTRUCTIONS
Try Temazepam 7.5 mg daily at bedtime for sleep while on steroids  Labs: CBC, BMP, thyroid    For shakes try Propranolol 10 mg twice daily    Schedule with neurologist and pharmacist     Preventive Health Recommendations  Female Ages 40 to 49    Yearly exam:     See your health care provider every year in order to  1. Review health changes.   2. Discuss preventive care.    3. Review your medicines if your doctor prescribed any.      Get a Pap test every three years (unless you have an abnormal result and your provider advises testing more often).      If you get Pap tests with HPV test, you only need to test every 5 years, unless you have an abnormal result. You do not need a Pap test if your uterus was removed (hysterectomy) and you have not had cancer.      You should be tested each year for STDs (sexually transmitted diseases), if you're at risk.       Ask your doctor if you should have a mammogram.      Have a colonoscopy (test for colon cancer) if someone in your family has had colon cancer or polyps before age 50.       Have a cholesterol test every 5 years.       Have a diabetes test (fasting glucose) after age 45. If you are at risk for diabetes, you should have this test every 3 years.    Shots: Get a flu shot each year. Get a tetanus shot every 10 years.     Nutrition:     Eat at least 5 servings of fruits and vegetables each day.    Eat whole-grain bread, whole-wheat pasta and brown rice instead of white grains and rice.    Talk to your provider about Calcium and Vitamin D.     Lifestyle    Exercise at least 150 minutes a week (an average of 30 minutes a day, 5 days a week). This will help you control your weight and prevent disease.    Limit alcohol to one drink per day.    No smoking.     Wear sunscreen to prevent skin cancer.    See your dentist every six months for an exam and cleaning.

## 2017-12-04 NOTE — TELEPHONE ENCOUNTER
MTM referral from: [facility] visit (referral by provider)    MTM referral outreach attempt #1 on December 4, 2017 at 4:03 PM      Outcome: Left Message    Kimberly Edwards MTM Coordinator

## 2017-12-04 NOTE — ED AVS SNAPSHOT
[facility] Emergency Department    [address] [address]    Phone:  [phone]    Fax:  [phone]                                       Yuni Lam   MRN: [id number]    Department:  [facility] Emergency Department   Date of Visit:  12/4/2017           After Visit Summary Signature Page     I have received my discharge instructions, and my questions have been answered. I have discussed any challenges I see with this plan with the nurse or doctor.    ..........................................................................................................................................  Patient/Patient Representative Signature      ..........................................................................................................................................  Patient Representative Print Name and Relationship to Patient    ..................................................               ................................................  Date                                            Time    ..........................................................................................................................................  Reviewed by Signature/Title    ...................................................              ..............................................  Date                                                            Time
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
## 2017-12-04 NOTE — NURSING NOTE
"Chief Complaint   Patient presents with     Physical     Shaking     Shaking has gotten worse the past 3 months        Initial /73  Pulse 101  Temp 97.4  F (36.3  C) (Tympanic)  Ht 5' 3.25\" (1.607 m)  Wt 99 lb 4.8 oz (45 kg)  BMI 17.45 kg/m2 Estimated body mass index is 17.45 kg/(m^2) as calculated from the following:    Height as of this encounter: 5' 3.25\" (1.607 m).    Weight as of this encounter: 99 lb 4.8 oz (45 kg).  Medication Reconciliation: complete  "

## 2017-12-04 NOTE — MR AVS SNAPSHOT
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 Review health changes.   2. Discuss preventive care.    3. Review your medicines if your doctor prescribed any.      Get a Pap test every three years (unless you have an abnormal result and your provider advises testing more often).      If you get Pap tests with HPV test, you only need to test every 5 years, unless you have an abnormal result. You do not need a Pap test if your uterus was removed (hysterectomy) and you have not had cancer.      You should be tested each year for STDs (sexually transmitted diseases), if you're at risk.       Ask your doctor if you should have a mammogram.      Have a colonoscopy (test for colon cancer) if someone in your family has had colon cancer or polyps before age 50.       Have a cholesterol test every 5 years.       Have a diabetes test (fasting glucose) after age 45. If you are at risk for diabetes, you should have this test every 3 years.    Shots: Get a flu shot each year. Get a tetanus shot every 10 years.     Nutrition:     Eat at least 5 servings of fruits and vegetables each day.    Eat whole-grain bread, whole-wheat pasta and brown rice instead of white grains and rice.    Talk to your  provider about Calcium and Vitamin D.     Lifestyle    Exercise at least 150 minutes a week (an average of 30 minutes a day, 5 days a week). This will help you control your weight and prevent disease.    Limit alcohol to one drink per day.    No smoking.     Wear sunscreen to prevent skin cancer.    See your dentist every six months for an exam and cleaning.          Follow-ups after your visit        Additional Services     NEUROLOGY ADULT REFERRAL       Your provider has referred you for the following:   Consult at [facility]: [facility] [place] [phone]   [email or web address]    Please be aware that coverage of these services is subject to the terms and limitations of your health insurance plan.  Call member services at your health plan with any benefit or coverage questions.      Please bring the following with you to your appointment:    (1) Any X-Rays, CTs or MRIs which have been performed.  Contact the facility where they were done to arrange for  prior to your scheduled appointment.    (2) List of current medications  (3) This referral request   (4) Any documents/labs given to you for this referral                  Your next 10 appointments already scheduled     Dec 04, 2017  2:30 PM CST   Office Visit with Et Nurse - [facility] ([facility])    [address] [facility] [address]   [phone]           Bring a current list of meds and any records pertaining to this visit. For Physicals, please bring immunization records and any forms needing to be filled out. Please arrive 10 minutes early to complete paperwork.            Dec 05, 2017  9:00 AM CST   [facility] Alejandro Belle MD   [facility]    [address] [facility] [address]   [phone]            Dec 12, 2017  2:00 PM CST   Return Visit with Alejandro Belle MD   [facility]  "[place] ([facility])    [address]  [facility] [address]   [phone]            Dec 16, 2017 10:30 AM CST   MA SCREENING DIGITAL BILATERAL with [facility]   [facility] ([facility])    [address] [facility] [address]   [phone]           Do not use any powder, lotion or deodorant under your arms or on your breast. If you do, we will ask you to remove it before your exam.  Wear comfortable, two-piece clothing.  If you have any allergies, tell your care team.  Bring any previous mammograms from other facilities or have them mailed to the breast center. Three-dimensional (3D) mammograms are available at [address] locations in [facility], [facility], [facility], and [place]. [facility] locations include [place] [facility] in [place]. Benefits of 3D mammograms include: - Improved rate of cancer detection - Decreases your chance of having to go back for more tests, which means fewer: - \"False-positive\" results (This means that there is an abnormal area but it isn't cancer.) - Invasive testing procedures, such as a biopsy or surgery - Can provide clearer images of the breast if you have dense breast tissue. 3D mammography is an optional exam that anyone can have with a 2D mammogram. It doesn't replace or take the place of a 2D mammogram. 2D mammograms remain an effective screening test for all women.  Not all insurance companies cover the cost of a 3D mammogram. Check with your insurance.              Who to contact     If you have questions or need follow up information about today's clinic visit or your schedule please contact [facility] directly at [phone].  Normal or non-critical lab and imaging results will be communicated to you by MyChart, letter or phone within 4 business days after the clinic has received the results. If you do not hear from us within 7 days, please " "contact the clinic through [employer] or phone. If you have a critical or abnormal lab result, we will notify you by phone as soon as possible.  Submit refill requests through [employer] or call your pharmacy and they will forward the refill request to us. Please allow 3 business days for your refill to be completed.          Additional Information About Your Visit        [facility] Information     [employer] gives you secure access to your electronic health record. If you see a primary care provider, you can also send messages to your care team and make appointments. If you have questions, please call your primary care clinic.  If you do not have a primary care provider, please call [phone] and they will assist you.        Care EveryWhere ID     This is your Care EveryWhere ID. This could be used by other organizations to access your [place] medical records  [id number]        Your Vitals Were     Pulse Temperature Height BMI (Body Mass Index)          101 97.4  F (36.3  C) (Tympanic) 5' 3.25\" (1.607 m) 17.45 kg/m2         Blood Pressure from Last 3 Encounters:   12/04/17 101/73   11/14/17 109/75   11/10/17 109/82    Weight from Last 3 Encounters:   12/04/17 99 lb 4.8 oz (45 kg)   11/10/17 102 lb 14.4 oz (46.7 kg)   10/13/10 117 lb (53.1 kg)              We Performed the Following     Basic metabolic panel     CBC with platelets differential     NEUROLOGY ADULT REFERRAL     TSH with free T4 reflex          Today's Medication Changes          These changes are accurate as of: 12/4/17  1:31 PM.  If you have any questions, ask your nurse or doctor.               Start taking these medicines.        Dose/Directions    propranolol 10 MG tablet   Commonly known as:  INDERAL   Used for:  Shakes   Started by:  Jonelle Pelayo APRN CNP        Dose:  10 mg   Take 1 tablet (10 mg) by mouth 2 times daily   Quantity:  60 tablet   Refills:  1       temazepam 7.5 MG capsule   Commonly known as:  RESTORIL   Used for:  Drug " induced insomnia (H)   Started by:  Jonelle Pelayo APRN CNP        Dose:  7.5 mg   Take 1 capsule (7.5 mg) by mouth nightly as needed for sleep   Quantity:  30 capsule   Refills:  0            Where to get your medicines      These medications were sent to [id number] - [place], [place] - [address]  [address], [facility] [address]     Phone:  [phone]     propranolol 10 MG tablet         Some of these will need a paper prescription and others can be bought over the counter.  Ask your nurse if you have questions.     Bring a paper prescription for each of these medications     temazepam 7.5 MG capsule                Primary Care Provider Office Phone # Fax #    MICAELA Patel [phone] [address] [facility] [address]        Equal Access to Services     SALTY AHUMADA : Hadmanda davis Sofrancoise, waaxda luqadaha, qaybta kaalmada adesaleemyapooja, maribel huertas . So [facility] [phone].    ATENCIÓN: Si habla español, tiene a stephen disposición servicios gratuitos de asistencia lingüística. Llame al [phone].    We comply with applicable federal civil rights laws and [place] laws. We do not discriminate on the basis of race, color, national origin, age, disability, sex, sexual orientation, or gender identity.            Thank you!     Thank you for choosing [facility]  for your care. Our goal is always to provide you with excellent care. Hearing back from our patients is one way we can continue to improve our services. Please take a few minutes to complete the written survey that you may receive in the mail after your visit with us. Thank you!             Your Updated Medication List - Protect others around you: Learn how to safely use, store and throw away your medicines at www.disposemymeds.org.          This list is accurate as of: 12/4/17  1:31 PM.  Always use your most recent med list.                    Brand Name Dispense Instructions for use Diagnosis    adalimumab 40 MG/0.8ML prefilled syringe kit    HUMIRA    6 kit    80 mg Day 1 and Day 2; 80 mg on Day 15; (Day 29), 40 mg every other week    Pyoderma gangrenosum       augmented betamethasone dipropionate 0.05 % cream    DIPROLENE-AF    100 g    Apply sparingly to affected area twice daily as needed.  Do not apply to face.    Pyoderma gangrenosum       calcium carbonate-vitamin D 600-400 MG-UNIT Chew     180 tablet    Take 1 chew tab by mouth 2 times daily    Pyoderma gangrenosa       * CAMPRAL PO      Take by mouth 3 times daily        * acamprosate 333 MG EC tablet    CAMPRAL    180 tablet    Take 2 tablets (666 mg) by mouth 3 times daily    Alcohol dependence in remission (H)       cycloSPORINE 100 MG capsule    sandIMMUNE    60 capsule    Take 1 capsule (100 mg) by mouth 2 times daily    Pyoderma gangrenosum       FUROSEMIDE PO      Take 20 mg by mouth daily        gabapentin 600 MG tablet    NEURONTIN    180 tablet    Take 2 tablets (1,200 mg) by mouth 3 times daily    Chronic pain syndrome       hydrOXYzine 25 MG tablet    ATARAX    90 tablet    Take 1-2 tablets (25-50 mg) by mouth 3 times daily as needed for anxiety    Anxiety       IRON SUPPLEMENT PO      Take 325 mg by mouth daily        nicotine polacrilex 2 MG gum    [employer] NICOTINE POLACRILEX    100 each    Place 1 each (2 mg) inside cheek every 2 hours as needed for smoking cessation    Tobacco abuse       PANTOPRAZOLE SODIUM PO      Take 40 mg by mouth daily        predniSONE 20 MG tablet    DELTASONE    90 tablet    Take 3 tablets (60 mg) by mouth daily    Pyoderma gangrenosum       propranolol 10 MG tablet    INDERAL    60 tablet    Take 1 tablet (10 mg) by mouth 2 times daily    Shakes       sertraline 100 MG tablet    ZOLOFT    30 tablet    Take 1 tablet (100 mg) by mouth daily    Anxiety       temazepam 7.5 MG capsule    RESTORIL    30 capsule    Take 1 capsule (7.5 mg) by mouth nightly as  needed for sleep    Drug induced insomnia (H)       traMADol 50 MG tablet    ULTRAM    90 tablet    Take 1 tablet (50 mg) by mouth every 6 hours as needed for pain maximum 3-4 tablet(s) per day    Chronic pain syndrome       traZODone 50 MG tablet    DESYREL    90 tablet    Take 3 tablets (150 mg) by mouth At Bedtime    Insomnia, unspecified type       VITAMIN D (CHOLECALCIFEROL) PO      Take 1,000 Units by mouth daily        * Notice:  This list has 2 medication(s) that are the same as other medications prescribed for you. Read the directions carefully, and ask your doctor or other care provider to review them with you.

## 2017-12-04 NOTE — PROGRESS NOTES
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  Pt states she is getting just over 24 hours on an ostomy pouch.  She has used the Coloplast Brava protective seal and most recent pouch placed without it, not sure if is making any difference in wear time and notes is more painful with it on.  Did receive her supplies from [facility].   She is taking prednisone, cyclosporine and calcium and vitamin D as ordered by Dr. Belle, having a hard time sleeping with prednisone and something is making her nauseated to has lost a bit of weight, now at 99lbs.  Did see her primary provider today, discussed this, her shakiness, pills coming through in pouch seemingly unabsorbed, nausea.  Is to see Dr. Belle tomorrow for follow-up.        History from Friday visit - Pt states she is taking Senakot if her stool thickens because she feels the thicker stool \"pushes the pouch off\" and causes leakage.  In terms of her Humara and topical steroid prescriptions she states she is still waiting for the Humara to be approved by insurance and state the topical steroid cream has been approved and \"is in the mail\".       Patient was referred by MICAELA Roy.      HPI from Initial Visit:  Pt as diagnosed with UC and had colectomy in '09 with temporary ileostomy then a J Pouch.  She had issues with the J Pouch for 7 years eventually deciding to have a take down of this with ileostomy this past July.  Per pt, she has has pouching issues since this start but this eventually was under control and down to a 2-3 day wear time until more recently when she developed PG about 4 weeks ago.  Pt is not sure if a biopsy was done but states they did a \"scraping\" initially and an oral steriod for a week plus local steriod injections then it got worse. She is now needing to replace pouches 2-3 times per day.  Pt is looking to establish care here as due to moving will no " "longer be going to [facility].  She is seeing Dr. Belle in dermatology tomorrow for treatment as well.  Patient is wearing a ConvaTec Esteem Flex Convex pouch [id number] cut to fit up to 35mm with belt.  Has tried rings but seemed possibly worse with rings.  Has been using hydrofera blue over the wound but having issues with this slipping out and into the pouch. Do note that though stoma is oval with below noted dimensions pt is cutting a round opening that does not adequately fit her stoma.      Co-morbidities include anxiety.  She also states she saw a GI specialist in [place] on November 1st due to \"problems with my bottom\" and was scoped.  Concern was for Crohn's though this provider did not think this was the case and per pt he thought is was more of a \"diverson colitis\".     Pouching tried so far:    1.) Aquacel Ag to wound followed by a piece of Coloplast Brava protective sheet to hold it in place. Shana cut to fit convex pouch #[id number] for softer convexity with Cam convex oval ring #[id number] and belt.  This did hold with no leaks when removed following day at dermatology appointment though was very comfortable for pt - too much pressure  2.) Moldable flat convatec 445233 barrier and pouch with a Cam convex oval barrier ring #[id number] with Aquacel Ag in the wound and 1/3rd of a Coloplast Protective barrier sheet over wound to secure the Aquacel Ag.  When initially placed in dermatology clinic on 11/14/17 pt states lasted 2 days though pt has tried again since and leaked is less than one day  3.) Moldable flat convatec barrier  [id number] and pouch with a [place] convex oval barrier ring #[id number] added skin bond cement and used Aquacel ag in wound with 1/2 of a 4 inch Adapt ring over superior peristomal wound area in place of the Coloplast barrier sheet to try as more skin friendly (skin bond cement on barrier ring except wound on on barrier/convex ring).  Leaking daily.   4.)denudement was crusted with " adapt powder and no-ting barrier then tried more flexible light convex oval shaped system, (Coloplast Sensura [place] Soft Convex #[id number] cut to fit stoma,) keeping more flush to abd and used a Coloplast Protective barrier sheet around stoma, (opening cut to fit stoma,) rather than just above.  This leaked by evening the day it was applied.  5.)  coloplast barrier wafer cut large enough to encompass the stoma but cover the wound.  Aquacel AG was placed in the wound.   [place] soft convex pouch cut to size of the barrier opening for some convexity but not too rigid.  Coloplast elastic barrier strips applied around the pouch.  Belly button filled with coloplast paste strip to flatten the area and then covered with mepilex tape.  Belt applied and snugged up tight. Leaked in less than 24 hours  6.) Nu-Hope flat one piece cut to fit oval 1 1/2 to 2 3/4 #1108, this oval shape appears to be a better fit for her abd space and flexibility of this pouch is similar to the Shana cut to fit convex which she feels most comfortable in.  This was placed with a [place] convex barrier ring #[id number] after crusting peristomal skin then placement of a coloplast brava protective barrier sheet cut to fit over wound and around stoma, strip paste to belly button.  Leaked in less than 24 hours    Objective: Patient's ileostomy is located in the right upper quadrant.  She is getting just over 24 hours out of a pouching system.   Pouch in place now on about 24 hours, this is Aquacel Ag in wound with diprolene cream, strip paste in naval, Coloplast protective barrier sheet then Shana cut to fit convex with belt.  With removal there is minimal absorption of stool into the Coloplast barrier sheet around stoma and would have lasted longer    Output: stool is loose, watery.    Stoma color: pink  Viability: good  Os/Lumen: at apex, is a loop stoma with stool loop having good profile though does tip laterally, remainder of stoma is flush  Tone: flaccid  at time and stoma becomes concave at times causing stool to well up in area  Profile: minimal and when sitting and especially standing does draw into soft abd more  Shape: oval  MCJ: attached, denudement resolved.  Has what may be some early PG breakdown beginning at MC junction at about 2:00, [id number], this has been stable  Stoma Size: 7/8 x 1 1/4  Peristomal Skin: there is a large full thickness wound superior and right up to stoma with no skin bridge to separate.  The wound is 95% soft white brown necrotic tissue, now seeing some pink spots with in.    Abd: Pt has a very soft abd around the stoma despite being very thin with narrow waist and there is a lot of creasing and loose skin in area with sitting.  Waist is very small so stoma is close to navel and tends to leak toward navel  Pain: intense pain with touch all around stoma but especially at wound and near MC junction at 7-9:00     Wound #1 Pyoderma wound above stoma  Specific Dimensions (length x width x depth, in cm) :  2.8cm x 2.5cmx up to 0.8cm estimated  Tunneling: none noted  Undermining: none  Wound Base: 95% slough  Palpation of the wound bed: unable to palpate due to pain  Periwound Skin: intact aside from wound  Temperature: WNL  Drainage: moderate  Odor: no  Pain:  intense          Assessment:  Ileostomy with already complicated pouching due to soft creasing abd, oval loop stoma with minimal profile and now extremely complicated due to large full thickness necrotic and painful PG ulceration next to stoma, now on a system with 24 hour wear      PG wound appears slightly improved with topical diprolene, prednisone and cyclosporine     Pt is in need of ongoing Medical management of the underlying pyoderma and other issues.       Pt needs to quit taking stool softeners and adjust her diet to get stool to thicken up, states she has not been taking.       Needs to talk with primary provider and pharmacy for medication alternatives to enteric  coated/better absorbed meds    Pt is very shakey with her hands which makes pouching difficult for her to do well, states hard for her to see underside of stoma, this is being addressed by primary care.     Plan:  Continue current plan of care, not using Brava protective seal, (ring,) for now.       Stoma requires convexity for proper seal though convexity likely contributing to discomfort, need to try to limit convexity as much as able. May benefit from a NuHope Nuflex light convex precut oval though tolerance of this may be difficult until wound improved.  Wound is necrotic and will need autolytic debridement since sharp debridement will exacerbate PG, this is now occurring with current plan.     Pt previous instructions:  Continue with Shana pouch #[id number], cut according to template and apply the Coloplast barrier sheet to the back of the pouch with same template cut, (sticking side to go to skin,) stretch out 1/2 of a Coloplast Brava Protective seal so thin, (no more than 1cm wide all the way around the opening,) of the cut edge and press in place.  This will use just under 1/2 of a seal.  Use the left over seal to apply in the crease directed toward your navel.      If this leaks, try sandwiching the ring in between the Coloplast barrier sheet and the Shana pouch, this will add some convexity but prevent the seal from absorbing as much moisture.    Prepare the wound by irrigating with saline, dry as well as you are able.  Prepare any irritated skin with powder and no-sting barrier as able.      Fill wound with about 3 layers of Aquacel Ag, working some betamethasone into the first layer so in contact with wound.    Place pouch as smoothly and evenly as possible.  Change daily.    Follow-up in one week.    Will need a letter of medical necessity for daily pouch changes (this has been signed by Dr. Belle and sent to [facility]).    Follow-up in 2 weeks    Face to face time approximately 45minutes.  [place]  SAHIL Knott, OCN       [phone]

## 2017-12-05 NOTE — ED PROVIDER NOTES
"  History     Chief Complaint   Patient presents with     Abnormal Labs     told to come back due to abnormal thyroid, WBC, and CR. \" Need IVF\"      HPI  Yuni Lam is a 42 year old female with complex past medical history for chronic colitis, stoma ulcers with diagnosis for pyoderma gangrenosa.  Currently on cyclosporine and prednisone.  Prednisone dosing as 60 mg daily.  Increased anxiety and insomnia with initiation of oral steroids.  Patient was in the clinic today for an annual physical examination.  Was seen by Jonelle Pelayo CNP.    Received a phone call advising that she come to the emergency room for evaluation of leukocytosis and elevated creatinine.  Certified nurse practitioner was concerned about possible infection and acute kidney injury.     Patient reports no acute health status change on top of her chronic health problems.  No change in appetite.  No change in stool output into her ostomy bag.  No fever chills or night sweats.  Appetite has been stable of late holding around 102-105 pounds.    No URI symptoms.  No cough or congestion.    No abdominal pain.    No worsening of the pyoderma around the stomal opening.    No leg swelling/DVT symptoms    No UTI symptoms    All vaginal discharge    No acute joint swelling or pain    Problem List:    Patient Active Problem List    Diagnosis Date Noted     Anxiety 11/12/2017     Priority: Medium     History of hysterectomy 11/12/2017     Priority: Medium     1997       History of total colectomy 11/12/2017     Priority: Medium     Guillain-[place] syndrome following vaccination (H) 11/12/2017     Priority: Medium     2013 after vaccination        Neuropathy 05/23/2014     Priority: Medium     CARDIOVASCULAR SCREENING; LDL GOAL LESS THAN 160 10/31/2010     Priority: Medium     Ulcerative colitis (H) 12/02/2009     Priority: Medium     Treated with colectomy       Depressive disorder, not elsewhere classified 09/28/2001     Priority: Medium        Past " Medical History:    Past Medical History:   Diagnosis Date     Abnormal Papanicolaou smear of cervix and cervical HPV      Allergic rhinitis due to pollen      Depressive disorder, not elsewhere classified      Ulcerative colitis        Past Surgical History:    Past Surgical History:   Procedure Laterality Date     C ANESTH, SECTION       C TOTAL ABDOM HYSTERECTOMY      Hysterectomy, Total Abdominal- prolapsed uterus     COLECTOMY      Ulcerative colitis     HC REMOVAL OF OVARY/TUBE(S)      Salpingo-Oophorectomy, Bilateral       Family History:    Family History   Problem Relation Age of Onset     Alcohol/Drug Father      Coronary Artery Disease Early Onset Father      CANCER Maternal Grandmother      lung     OSTEOPOROSIS Maternal Grandmother      CEREBROVASCULAR DISEASE Maternal Grandmother      DIABETES Sister      gestational     Emphysema Mother        Social History:  Marital Status:  Legally  [3]  Social History   Substance Use Topics     Smoking status: Current Every Day Smoker     Packs/day: 0.50     Years: 15.00     Smokeless tobacco: Never Used     Alcohol use 6.0 oz/week        Medications:      propranolol (INDERAL) 10 MG tablet   temazepam (RESTORIL) 7.5 MG capsule   calcium carbonate-vitamin D 600-400 MG-UNIT CHEW   predniSONE (DELTASONE) 20 MG tablet   cycloSPORINE (SANDIMMUNE) 100 MG capsule   hydrOXYzine (ATARAX) 25 MG tablet   nicotine polacrilex (NICORETTE) 2 MG gum   augmented betamethasone dipropionate (DIPROLENE-AF) 0.05 % cream   PANTOPRAZOLE SODIUM PO   FUROSEMIDE PO   VITAMIN D, CHOLECALCIFEROL, PO   Ferrous Sulfate (IRON SUPPLEMENT PO)   traMADol (ULTRAM) 50 MG tablet   traZODone (DESYREL) 50 MG tablet   sertraline (ZOLOFT) 100 MG tablet   gabapentin (NEURONTIN) 600 MG tablet         Review of Systems  All pertinent positives and negatives are documented in the HPI.  All others reviewed and are negative .    Physical Exam   BP: 128/86  Pulse: 75  Temp: 98.1   F (36.7  C)  Resp: 16  SpO2: 99 %      Physical Exam  Vital signs reviewed  Nursing notes reviewed  Patient does not appear acutely ill  Head:  Normocephalic, atraumatic.  Eyes:  PERRLA, full EOM.  External exams normal.  Conjunctiva clear.  No scleral icterus.  Ears:  Normal - pinnae, canals, and TM's.    Nose:  Patent, without deformity.  No rhinorrhea.  Throat:  Moist mucous membranes without lesions, erythema, or exudate.   Neck:  Supple, without masses, lymphadenopathy or tenderness.  Normal range of motion.  Neck is supple.  Respiratory:  Normal respiratory effort.  Lungs are clear with good breath sounds.  No cough or congestion.    Heart:  RR without murmurs, rubs, or gallops.  No extrasystoles.  Normal S1 and S2 heart sounds  Abdomen: Ostomy bag is full of dark brown liquid stool.  No worsening of the pyoderma around the stomal opening  Soft.  Bowel sounds present.  No tenderness, guarding, rebound.  No palpable hernia or mass.  No hepatomegaly.  Spleen tip not palpable.    Physical skeletal: No deformity.  No joint swelling.     Neurologic: Alert.  GCS = 15.  No cranial nerve deficit.  Normal sensory examination.  Normal muscle tone.  No atrophy or tremor.  Normal cerebellar function testing.  DTRs are normal and symmetric.  Resting tremor present.    Skin: Warm dry and intact.  No bruising.  No rash.    Psychiatric: Mood and affect is mildly anxious  Speech normal.  Behavior normal.  Thought content normal.  Cognition and memory normal.  Judgment normal.      ED Course     ED Course     Procedures                   Results for orders placed or performed during the hospital encounter of 12/04/17 (from the past 24 hour(s))   CBC with platelets differential   Result Value Ref Range    WBC 16.2 (H) 4.0 - 11.0 10e9/L    RBC Count 5.18 3.8 - 5.2 10e12/L    Hemoglobin 13.6 11.7 - 15.7 g/dL    Hematocrit 40.9 35.0 - 47.0 %    MCV 79 78 - 100 fl    MCH 26.3 (L) 26.5 - 33.0 pg    MCHC 33.3 31.5 - 36.5 g/dL    RDW  15.4 (H) 10.0 - 15.0 %    Platelet Count 235 150 - 450 10e9/L    Diff Method Automated Method     % Neutrophils 82.3 %    % Lymphocytes 13.5 %    % Monocytes 3.8 %    % Eosinophils 0.0 %    % Basophils 0.0 %    % Immature Granulocytes 0.4 %    Absolute Neutrophil 13.3 (H) 1.6 - 8.3 10e9/L    Absolute Lymphocytes 2.2 0.8 - 5.3 10e9/L    Absolute Monocytes 0.6 0.0 - 1.3 10e9/L    Absolute Eosinophils 0.0 0.0 - 0.7 10e9/L    Absolute Basophils 0.0 0.0 - 0.2 10e9/L    Abs Immature Granulocytes 0.1 0 - 0.4 10e9/L         Assessments & Plan (with Medical Decision Making)  42-year-old female presents at the request of her primary clinic provider for evaluation of a cytosis and elevated creatinine.  Seen today by her nurse practitioner in the clinic for her annual examination.  White count now to be 16,000 with no left shift.  Cr.= 2.5 .  A review of her past history indicates the patient has had recent diagnosis of pyoderma gangrenosum.  Consistent with history for inflammatory bowel disease.  This is not an acute infectious or gangrenous process(despite the confusion of the name)- is an inflammatory process.  Her white blood cell count is elevated because of being a prednisone 60 mg daily.  Her history and examination does not support any acute infection.  Patient looked slightly fluid volume down and her lab work was consistent with more of a prerenal cause for her acute kidney status change.  I suspect that this will correct itself with hydration.  With the patient in the emergency room we did administer normal saline IV at 20 cc/kg.  Patient is set up for repeat creatinine on Wednesday morning with results to be forwarded to primary clinic provider.       I have reviewed the nursing notes.    I have reviewed the findings, diagnosis, plan and need for follow up with the patient.      New Prescriptions    No medications on file       Final diagnoses:   Leukocytosis, unspecified type-steroid-induced   Acute kidney injury  (H)-suspect prerenal cause/dehydration       12/4/2017   [facility] EMERGENCY DEPARTMENT     Javier Knott DO  12/04/17 [id number]

## 2017-12-05 NOTE — DISCHARGE INSTRUCTIONS
Continue to push oral fluids  Follow-up at the [facility] on Wednesday morning for blood draw to recheck kidney functions.  Call [place] with no your coming in.  Contact your clinic provider on on Wednesday for results.

## 2017-12-05 NOTE — ED NOTES
Pt presents to Ed from clinic with elevated WBC, creatinine. Feels fine otherwise . Complicated Medical hx. Has colostomy, with wound near ostomy site. Seeing wound care and dermatology for this. Reports she is taking a medication, cant remember what it is, for this that has risk for MATIAS.

## 2017-12-06 ENCOUNTER — TELEPHONE (OUTPATIENT)
Dept: FAMILY MEDICINE | Facility: CLINIC | Age: 42
End: 2017-12-06

## 2017-12-06 DIAGNOSIS — N17.9 ACUTE RENAL INJURY (H): Primary | ICD-10-CM

## 2017-12-06 DIAGNOSIS — N17.9 ACUTE KIDNEY INJURY (H): ICD-10-CM

## 2017-12-06 LAB
ANION GAP SERPL CALCULATED.3IONS-SCNC: 6 MMOL/L (ref 3–14)
BUN SERPL-MCNC: 41 MG/DL (ref 7–30)
CALCIUM SERPL-MCNC: 8.4 MG/DL (ref 8.5–10.1)
CHLORIDE SERPL-SCNC: 110 MMOL/L (ref 94–109)
CO2 SERPL-SCNC: 21 MMOL/L (ref 20–32)
CREAT SERPL-MCNC: 1.42 MG/DL (ref 0.52–1.04)
GFR SERPL CREATININE-BSD FRML MDRD: 40 ML/MIN/1.7M2
GLUCOSE SERPL-MCNC: 134 MG/DL (ref 70–99)
POTASSIUM SERPL-SCNC: 4.2 MMOL/L (ref 3.4–5.3)
SODIUM SERPL-SCNC: 137 MMOL/L (ref 133–144)

## 2017-12-06 PROCEDURE — 80048 BASIC METABOLIC PNL TOTAL CA: CPT | Performed by: EMERGENCY MEDICINE

## 2017-12-06 PROCEDURE — 36415 COLL VENOUS BLD VENIPUNCTURE: CPT | Performed by: EMERGENCY MEDICINE

## 2017-12-06 NOTE — TELEPHONE ENCOUNTER
MTM referral from: [facility] visit (referral by provider)    MTM referral outreach attempt #2 on December 6, 2017 at 12:55 PM      Outcome: Patient not reachable after several attempts, will route to MTM Pharmacist/Provider as an FYI. Thank you for the referral.    Kimberly Edwards, MTM Coordinator

## 2017-12-07 NOTE — TELEPHONE ENCOUNTER
[facility] - Medical Opinion Form completed and routed to BRIGIDO Pelayo to review and sign.

## 2017-12-12 ENCOUNTER — OFFICE VISIT (OUTPATIENT)
Dept: DERMATOLOGY | Facility: CLINIC | Age: 42
End: 2017-12-12
Payer: MEDICAID

## 2017-12-12 VITALS — DIASTOLIC BLOOD PRESSURE: 93 MMHG | HEART RATE: 60 BPM | SYSTOLIC BLOOD PRESSURE: 150 MMHG | OXYGEN SATURATION: 100 %

## 2017-12-12 DIAGNOSIS — L88 PYODERMA GANGRENOSA (H): Primary | ICD-10-CM

## 2017-12-12 PROCEDURE — 99213 OFFICE O/P EST LOW 20 MIN: CPT | Performed by: DERMATOLOGY

## 2017-12-12 NOTE — NURSING NOTE
"Initial BP (!) 150/93  Pulse 60  SpO2 100% Estimated body mass index is 17.45 kg/(m^2) as calculated from the following:    Height as of 12/4/17: 1.607 m (5' 3.25\").    Weight as of 12/4/17: 45 kg (99 lb 4.8 oz). .      "

## 2017-12-12 NOTE — MR AVS SNAPSHOT
"              After Visit Summary   12/12/2017    Yuni Lam    MRN: [id number]           Patient Information     Date Of Birth          1975        Visit Information        Provider Department      12/12/2017 2:00 PM Alejandro Belle MD [facility]        Today's Diagnoses     Pyoderma gangrenosa    -  1       Follow-ups after your visit        Your next 10 appointments already scheduled     Dec 16, 2017 10:30 AM CST   MA SCREENING DIGITAL BILATERAL with [address] [facility] ([facility])    [address] [facility] [address] [facility] [address]   [phone]           Do not use any powder, lotion or deodorant under your arms or on your breast. If you do, we will ask you to remove it before your exam.  Wear comfortable, two-piece clothing.  If you have any allergies, tell your care team.  Bring any previous mammograms from other facilities or have them mailed to the breast center. Three-dimensional (3D) mammograms are available at [place] locations in [facility], [place], [place], [place], and [place]. [facility] locations include [facility] in [place]. Benefits of 3D mammograms include: - Improved rate of cancer detection - Decreases your chance of having to go back for more tests, which means fewer: - \"False-positive\" results (This means that there is an abnormal area but it isn't cancer.) - Invasive testing procedures, such as a biopsy or surgery - Can provide clearer images of the breast if you have dense breast tissue. 3D mammography is an optional exam that anyone can have with a 2D mammogram. It doesn't replace or take the place of a 2D mammogram. 2D mammograms remain an effective screening test for all women.  Not all insurance companies cover the cost of a 3D mammogram. Check with your insurance.            Dec 19, 2017  1:30 PM CST   Office Visit with Et Nurse - [facility] " Wound Ostomy ([facility])    [address] [facility] [address]   [phone]           Bring a current list of meds and any records pertaining to this visit. For Physicals, please bring immunization records and any forms needing to be filled out. Please arrive 10 minutes early to complete paperwork.              Future tests that were ordered for you today     Open Future Orders        Priority Expected Expires Ordered    CBC with platelets Routine 12/19/2017 12/12/2018 12/12/2017    Comprehensive metabolic panel Routine 12/19/2017 12/12/2018 12/12/2017            Who to contact     If you have questions or need follow up information about today's clinic visit or your schedule please contact [facility] directly at [phone].  Normal or non-critical lab and imaging results will be communicated to you by MyChart, letter or phone within 4 business days after the clinic has received the results. If you do not hear from us within 7 days, please contact the clinic through MyChart or phone. If you have a critical or abnormal lab result, we will notify you by phone as soon as possible.  Submit refill requests through [facility] or call your pharmacy and they will forward the refill request to us. Please allow 3 business days for your refill to be completed.          Additional Information About Your Visit        [employer] Information     [facility] gives you secure access to your electronic health record. If you see a primary care provider, you can also send messages to your care team and make appointments. If you have questions, please call your primary care clinic.  If you do not have a primary care provider, please call [phone] and they will assist you.        Care EveryWhere ID     This is your Care EveryWhere ID. This could be used by other organizations to access your [facility] medical records  [id number]        Your Vitals Were     Pulse Pulse Oximetry                60 100%            Blood Pressure from Last 3 Encounters:   12/12/17 (!) 150/93   12/04/17 110/62   12/04/17 101/73    Weight from Last 3 Encounters:   12/04/17 45 kg (99 lb 4.8 oz)   11/10/17 46.7 kg (102 lb 14.4 oz)   10/13/10 53.1 kg (117 lb)                 Today's Medication Changes          These changes are accurate as of: 12/12/17  2:04 PM.  If you have any questions, ask your nurse or doctor.               These medicines have changed or have updated prescriptions.        Dose/Directions    predniSONE 20 MG tablet   Commonly known as:  DELTASONE   This may have changed:    - how much to take  - when to take this   Used for:  Pyoderma gangrenosum        Dose:  60 mg   Take 3 tablets (60 mg) by mouth daily   Quantity:  90 tablet   Refills:  3                Primary Care Provider Office Phone # Fax #    Jonelle Montoya Pierce, APRN [phone] [address] [facility] [address]        Equal Access to Services     SALTY AHUMADA AH: Hadii suleman alicea hadasho Soomaali, waaxda luqadaha, qaybta kaalmada adeegyada, maribel huertas . So [facility] [phone].    ATENCIÓN: Si habla español, tiene a stephen disposición servicios gratuitos de asistencia lingüística. Llame al [phone].    We comply with applicable federal civil rights laws and [place] laws. We do not discriminate on the basis of race, color, national origin, age, disability, sex, sexual orientation, or gender identity.            Thank you!     Thank you for choosing [facility]  for your care. Our goal is always to provide you with excellent care. Hearing back from our patients is one way we can continue to improve our services. Please take a few minutes to complete the written survey that you may receive in the mail after your visit with us. Thank you!             Your Updated Medication List - Protect others around you: Learn how to safely use, store and throw away your medicines at www.disposemymeds.org.           This list is accurate as of: 12/12/17  2:04 PM.  Always use your most recent med list.                   Brand Name Dispense Instructions for use Diagnosis    augmented betamethasone dipropionate 0.05 % cream    DIPROLENE-AF    100 g    Apply sparingly to affected area twice daily as needed.  Do not apply to face.    Pyoderma gangrenosum       calcium carbonate-vitamin D 600-400 MG-UNIT Chew     180 tablet    Take 1 chew tab by mouth 2 times daily    Pyoderma gangrenosa       ciprofloxacin 500 MG tablet    CIPRO     Take 500 mg by mouth 2 times daily        cycloSPORINE 100 MG capsule    sandIMMUNE    60 capsule    Take 1 capsule (100 mg) by mouth 2 times daily    Pyoderma gangrenosum       FIBER ADULT GUMMIES 2 G Chew      Take 1 chew tab by mouth daily        FUROSEMIDE PO      Take 20 mg by mouth daily as needed        gabapentin 600 MG tablet    NEURONTIN    180 tablet    Take 2 tablets (1,200 mg) by mouth 3 times daily    Chronic pain syndrome       hydrOXYzine 25 MG tablet    ATARAX    90 tablet    Take 1-2 tablets (25-50 mg) by mouth 3 times daily as needed for anxiety    Anxiety       nicotine 21 MG/24HR 24 hr patch    NICODERM CQ     Place 1 patch onto the skin daily as needed        nicotine polacrilex 2 MG gum    [employer] NICOTINE POLACRILEX    100 each    Place 1 each (2 mg) inside cheek every 2 hours as needed for smoking cessation    Tobacco abuse       PANTOPRAZOLE SODIUM PO      Take 40 mg by mouth daily        predniSONE 20 MG tablet    DELTASONE    90 tablet    Take 3 tablets (60 mg) by mouth daily    Pyoderma gangrenosum       propranolol 10 MG tablet    INDERAL    60 tablet    Take 1 tablet (10 mg) by mouth 2 times daily    Shakes       senna-docusate 8.6-50 MG per tablet    SENOKOT-S;PERICOLACE     Take 2 tablets by mouth daily as needed        sertraline 100 MG tablet    ZOLOFT    30 tablet    Take 1 tablet (100 mg) by mouth daily    Anxiety       temazepam 7.5 MG capsule    RESTORIL    30 capsule     Take 1 capsule (7.5 mg) by mouth nightly as needed for sleep    Drug induced insomnia (H)       traMADol 50 MG tablet    ULTRAM    90 tablet    Take 1 tablet (50 mg) by mouth every 6 hours as needed for pain maximum 3-4 tablet(s) per day    Chronic pain syndrome       traZODone 50 MG tablet    DESYREL    90 tablet    Take 3 tablets (150 mg) by mouth At Bedtime    Insomnia, unspecified type

## 2017-12-12 NOTE — PROGRESS NOTES
Yuni Lam is a 42 year old year old female patient here today for f/u PG.  We couldn't get humira covered. Hs eis on CsA and prednisone.  She notes pain is much better, odor is much better.  Associated symptoms: none.  Patient has no other skin complaints today.  Remainder of the HPI, Meds, PMH, Allergies, FH, and SH was reviewed in chart.      Past Medical History:   Diagnosis Date     Abnormal Papanicolaou smear of cervix and cervical HPV     LEEP     Allergic rhinitis due to pollen      Depressive disorder, not elsewhere classified     Depression (non-psychotic)     Ulcerative colitis        Past Surgical History:   Procedure Laterality Date     C ANESTH, SECTION       C TOTAL ABDOM HYSTERECTOMY      Hysterectomy, Total Abdominal- prolapsed uterus     COLECTOMY      Ulcerative colitis     HC REMOVAL OF OVARY/TUBE(S)      Salpingo-Oophorectomy, Bilateral        Family History   Problem Relation Age of Onset     Alcohol/Drug Father      Coronary Artery Disease Early Onset Father      CANCER Maternal Grandmother      lung     OSTEOPOROSIS Maternal Grandmother      CEREBROVASCULAR DISEASE Maternal Grandmother      DIABETES Sister      gestational     Emphysema Mother        Social History     Social History     Marital status: Legally      Spouse name: N/A     Number of children: N/A     Years of education: N/A     Occupational History     Not on file.     Social History Main Topics     Smoking status: Current Every Day Smoker     Packs/day: 0.50     Years: 15.00     Smokeless tobacco: Never Used     Alcohol use 6.0 oz/week     Drug use: No     Sexual activity: Yes     Partners: Male     Birth control/ protection: Surgical     Other Topics Concern     Not on file     Social History Narrative       Outpatient Encounter Prescriptions as of 2017   Medication Sig Dispense Refill     propranolol (INDERAL) 10 MG tablet Take 1 tablet (10 mg) by mouth 2 times daily 60 tablet 1      temazepam (RESTORIL) 7.5 MG capsule Take 1 capsule (7.5 mg) by mouth nightly as needed for sleep 30 capsule 0     nicotine (NICODERM CQ) 21 MG/24HR 24 hr patch Place 1 patch onto the skin daily as needed       FIBER ADULT GUMMIES 2 G CHEW Take 1 chew tab by mouth daily       senna-docusate (SENOKOT-S;PERICOLACE) 8.6-50 MG per tablet Take 2 tablets by mouth daily as needed       ciprofloxacin (CIPRO) 500 MG tablet Take 500 mg by mouth 2 times daily       calcium carbonate-vitamin D 600-400 MG-UNIT CHEW Take 1 chew tab by mouth 2 times daily 180 tablet 3     predniSONE (DELTASONE) 20 MG tablet Take 3 tablets (60 mg) by mouth daily (Patient taking differently: Take 20 mg by mouth 3 times daily ) 90 tablet 3     cycloSPORINE (SANDIMMUNE) 100 MG capsule Take 1 capsule (100 mg) by mouth 2 times daily 60 capsule 3     hydrOXYzine (ATARAX) 25 MG tablet Take 1-2 tablets (25-50 mg) by mouth 3 times daily as needed for anxiety 90 tablet 2     nicotine polacrilex (NICORETTE) 2 MG gum Place 1 each (2 mg) inside cheek every 2 hours as needed for smoking cessation 100 each 1     augmented betamethasone dipropionate (DIPROLENE-AF) 0.05 % cream Apply sparingly to affected area twice daily as needed.  Do not apply to face. 100 g 3     PANTOPRAZOLE SODIUM PO Take 40 mg by mouth daily       FUROSEMIDE PO Take 20 mg by mouth daily as needed        traMADol (ULTRAM) 50 MG tablet Take 1 tablet (50 mg) by mouth every 6 hours as needed for pain maximum 3-4 tablet(s) per day 90 tablet 0     traZODone (DESYREL) 50 MG tablet Take 3 tablets (150 mg) by mouth At Bedtime 90 tablet 2     sertraline (ZOLOFT) 100 MG tablet Take 1 tablet (100 mg) by mouth daily 30 tablet 2     gabapentin (NEURONTIN) 600 MG tablet Take 2 tablets (1,200 mg) by mouth 3 times daily 180 tablet 1     No facility-administered encounter medications on file as of 12/12/2017.              Review Of Systems  Skin: As above  Eyes: negative  Ears/Nose/Throat:  negative  Respiratory: No shortness of breath, dyspnea on exertion, cough, or hemoptysis  Cardiovascular: negative  Gastrointestinal: negative  Genitourinary: negative  Musculoskeletal: negative  Neurologic: negative  Psychiatric: negative  Hematologic/Lymphatic/Immunologic: negative  Endocrine: negative      O:   NAD, WDWN, Alert & Oriented, Mood & Affect wnl, Vitals stable   Here today alone   BP (!) 150/93  Pulse 60  SpO2 100%   General appearance normal   Vitals stable   Alert, oriented and in no acute distress     ostomoy in place skin grnaulating           The remainder of expanded problem focused exam was unremarkable; the following areas were examined:  scalp/hair, conjunctiva/lids, face, neck, lips      Eyes: Conjunctivae/lids:Normal     ENT: Lips, buccal mucosa, tongue: normal    MSK:Normal    Cardiovascular: peripheral edema none    Pulm: Breathing Normal    Neuro/Psych: Orientation:Normal; Mood/Affect:Normal      A/P:  1. PG  Check cbc and cmp  Decrease pred 50mg for two weeks  40 mg for two weeks  Cont CsA  Return to clinic 4 weeks  Doing better  Cont wound care

## 2017-12-12 NOTE — LETTER
2017         RE: Yuni Lam  [address] [place] [address]        Dear Colleague,    Thank you for referring your patient, Yuni Lam, to the [facility]. Please see a copy of my visit note below.    Yuni Lam is a 42 year old year old female patient here today for f/u PG.  We couldn't get humira covered. Hs eis on CsA and prednisone.  She notes pain is much better, odor is much better.  Associated symptoms: none.  Patient has no other skin complaints today.  Remainder of the HPI, Meds, PMH, Allergies, FH, and SH was reviewed in chart.      Past Medical History:   Diagnosis Date     Abnormal Papanicolaou smear of cervix and cervical HPV     LEEP     Allergic rhinitis due to pollen      Depressive disorder, not elsewhere classified     Depression (non-psychotic)     Ulcerative colitis        Past Surgical History:   Procedure Laterality Date     C ANESTH, SECTION       C TOTAL ABDOM HYSTERECTOMY      Hysterectomy, Total Abdominal- prolapsed uterus     COLECTOMY      Ulcerative colitis     HC REMOVAL OF OVARY/TUBE(S)      Salpingo-Oophorectomy, Bilateral        Family History   Problem Relation Age of Onset     Alcohol/Drug Father      Coronary Artery Disease Early Onset Father      CANCER Maternal Grandmother      lung     OSTEOPOROSIS Maternal Grandmother      CEREBROVASCULAR DISEASE Maternal Grandmother      DIABETES Sister      gestational     Emphysema Mother        Social History     Social History     Marital status: Legally      Spouse name: N/A     Number of children: N/A     Years of education: N/A     Occupational History     Not on file.     Social History Main Topics     Smoking status: Current Every Day Smoker     Packs/day: 0.50     Years: 15.00     Smokeless tobacco: Never Used     Alcohol use 6.0 oz/week     Drug use: No     Sexual activity: Yes     Partners: Male     Birth control/ protection: Surgical     Other  Topics Concern     Not on file     Social History Narrative       Outpatient Encounter Prescriptions as of 12/12/2017   Medication Sig Dispense Refill     propranolol (INDERAL) 10 MG tablet Take 1 tablet (10 mg) by mouth 2 times daily 60 tablet 1     temazepam (RESTORIL) 7.5 MG capsule Take 1 capsule (7.5 mg) by mouth nightly as needed for sleep 30 capsule 0     nicotine (NICODERM CQ) 21 MG/24HR 24 hr patch Place 1 patch onto the skin daily as needed       FIBER ADULT GUMMIES 2 G CHEW Take 1 chew tab by mouth daily       senna-docusate (SENOKOT-S;PERICOLACE) 8.6-50 MG per tablet Take 2 tablets by mouth daily as needed       ciprofloxacin (CIPRO) 500 MG tablet Take 500 mg by mouth 2 times daily       calcium carbonate-vitamin D 600-400 MG-UNIT CHEW Take 1 chew tab by mouth 2 times daily 180 tablet 3     predniSONE (DELTASONE) 20 MG tablet Take 3 tablets (60 mg) by mouth daily (Patient taking differently: Take 20 mg by mouth 3 times daily ) 90 tablet 3     cycloSPORINE (SANDIMMUNE) 100 MG capsule Take 1 capsule (100 mg) by mouth 2 times daily 60 capsule 3     hydrOXYzine (ATARAX) 25 MG tablet Take 1-2 tablets (25-50 mg) by mouth 3 times daily as needed for anxiety 90 tablet 2     nicotine polacrilex (NICORETTE) 2 MG gum Place 1 each (2 mg) inside cheek every 2 hours as needed for smoking cessation 100 each 1     augmented betamethasone dipropionate (DIPROLENE-AF) 0.05 % cream Apply sparingly to affected area twice daily as needed.  Do not apply to face. 100 g 3     PANTOPRAZOLE SODIUM PO Take 40 mg by mouth daily       FUROSEMIDE PO Take 20 mg by mouth daily as needed        traMADol (ULTRAM) 50 MG tablet Take 1 tablet (50 mg) by mouth every 6 hours as needed for pain maximum 3-4 tablet(s) per day 90 tablet 0     traZODone (DESYREL) 50 MG tablet Take 3 tablets (150 mg) by mouth At Bedtime 90 tablet 2     sertraline (ZOLOFT) 100 MG tablet Take 1 tablet (100 mg) by mouth daily 30 tablet 2     gabapentin (NEURONTIN) 600  MG tablet Take 2 tablets (1,200 mg) by mouth 3 times daily 180 tablet 1     No facility-administered encounter medications on file as of 12/12/2017.              Review Of Systems  Skin: As above  Eyes: negative  Ears/Nose/Throat: negative  Respiratory: No shortness of breath, dyspnea on exertion, cough, or hemoptysis  Cardiovascular: negative  Gastrointestinal: negative  Genitourinary: negative  Musculoskeletal: negative  Neurologic: negative  Psychiatric: negative  Hematologic/Lymphatic/Immunologic: negative  Endocrine: negative      O:   NAD, WDWN, Alert & Oriented, Mood & Affect wnl, Vitals stable   Here today alone   BP (!) 150/93  Pulse 60  SpO2 100%   General appearance normal   Vitals stable   Alert, oriented and in no acute distress     ostomoy in place skin grnaulating           The remainder of expanded problem focused exam was unremarkable; the following areas were examined:  scalp/hair, conjunctiva/lids, face, neck, lips      Eyes: Conjunctivae/lids:Normal     ENT: Lips, buccal mucosa, tongue: normal    MSK:Normal    Cardiovascular: peripheral edema none    Pulm: Breathing Normal    Neuro/Psych: Orientation:Normal; Mood/Affect:Normal      A/P:  1. PG  Check cbc and cmp  Decrease pred 50mg for two weeks  40 mg for two weeks  Cont CsA  Return to clinic 4 weeks  Doing better  Cont wound care       Again, thank you for allowing me to participate in the care of your patient.        Sincerely,        Alejandro Belle MD

## 2017-12-16 ENCOUNTER — HOSPITAL ENCOUNTER (OUTPATIENT)
Dept: MAMMOGRAPHY | Facility: CLINIC | Age: 42
Discharge: HOME | End: 2017-12-16
Attending: NURSE PRACTITIONER | Admitting: NURSE PRACTITIONER
Payer: MEDICAID

## 2017-12-16 DIAGNOSIS — Z12.31 VISIT FOR SCREENING MAMMOGRAM: ICD-10-CM

## 2017-12-16 PROCEDURE — G0202 SCR MAMMO BI INCL CAD: HCPCS

## 2017-12-17 ENCOUNTER — MYC MEDICAL ADVICE (OUTPATIENT)
Dept: FAMILY MEDICINE | Facility: CLINIC | Age: 42
End: 2017-12-17

## 2017-12-17 DIAGNOSIS — F19.982 DRUG INDUCED INSOMNIA (H): ICD-10-CM

## 2017-12-17 DIAGNOSIS — K21.9 GASTROESOPHAGEAL REFLUX DISEASE WITHOUT ESOPHAGITIS: ICD-10-CM

## 2017-12-17 DIAGNOSIS — G89.4 CHRONIC PAIN SYNDROME: Primary | ICD-10-CM

## 2017-12-17 DIAGNOSIS — R25.1 SHAKES: ICD-10-CM

## 2017-12-18 RX ORDER — TEMAZEPAM 7.5 MG/1
7.5 CAPSULE ORAL
Qty: 30 CAPSULE | Refills: 0 | Status: SHIPPED | OUTPATIENT
Start: 2017-12-18 | End: 2018-01-31

## 2017-12-18 RX ORDER — TRAMADOL HYDROCHLORIDE 50 MG/1
50 TABLET ORAL EVERY 6 HOURS PRN
Qty: 90 TABLET | Refills: 0 | Status: SHIPPED | OUTPATIENT
Start: 2017-12-18

## 2017-12-18 RX ORDER — PANTOPRAZOLE SODIUM 40 MG/1
TABLET, DELAYED RELEASE ORAL
Qty: 60 TABLET | Refills: 1 | Status: SHIPPED | OUTPATIENT
Start: 2017-12-18

## 2017-12-18 RX ORDER — PROPRANOLOL HYDROCHLORIDE 20 MG/1
20 TABLET ORAL 2 TIMES DAILY
Qty: 60 TABLET | Refills: 1 | Status: SHIPPED | OUTPATIENT
Start: 2017-12-18

## 2017-12-18 NOTE — TELEPHONE ENCOUNTER
Please see mychart regarding medication refills.  She is requesting an increase in dose on 2 of them.    1)  The propranolol sig states she can BID - she has refills at pharmacy, this does not need a refill or change in sig.  2) temazepam was refilled 12-4-17 - she has refills at pharmacy.  3) Pantoprazole is listed as historical and it is requesting an alternative.  4) if approved she will need a refill on tramadol    Pharm ready.      Routing to provider.  Radha MARK RN

## 2017-12-18 NOTE — TELEPHONE ENCOUNTER
Message left for patient.  Rx for temazepam and tramadol faxed to [facility].  See message below.

## 2017-12-18 NOTE — TELEPHONE ENCOUNTER
Please inform patient I recommend to increase Propranolol to 20 mg twice daily, please fax Tramadol and Temazepam. Protonix, I ordered as twice daily for 2 weeks, then decrease to 1 tablet daily      MICAELA Ferris CNP

## 2017-12-19 ENCOUNTER — HOSPITAL ENCOUNTER (OUTPATIENT)
Dept: WOUND CARE | Facility: CLINIC | Age: 42
Discharge: HOME | End: 2017-12-19
Attending: SURGERY | Admitting: SURGERY
Payer: MEDICAID

## 2017-12-19 PROCEDURE — 99213 OFFICE O/P EST LOW 20 MIN: CPT

## 2017-12-19 PROCEDURE — A6235 HYDROCOLLD DRG >16<=48 W/O B: HCPCS

## 2017-12-19 NOTE — IP AVS SNAPSHOT
MRN:[id number]                      After Visit Summary   12/19/2017    Yuni Lam    MRN: [id number]           Visit Information        Provider Department      12/19/2017  1:30 PM Sky Alegre Nurse - [facility]           Review of your medicines      UNREVIEWED medicines. Ask your doctor about these medicines        Dose / Directions    augmented betamethasone dipropionate 0.05 % cream   Commonly known as:  DIPROLENE-AF   Used for:  Pyoderma gangrenosum        Apply sparingly to affected area twice daily as needed.  Do not apply to face.   Quantity:  100 g   Refills:  3       calcium carbonate-vitamin D 600-400 MG-UNIT Chew   Used for:  Pyoderma gangrenosa        Dose:  1 chew tab   Take 1 chew tab by mouth 2 times daily   Quantity:  180 tablet   Refills:  3       cycloSPORINE 100 MG capsule   Commonly known as:  sandIMMUNE   Used for:  Pyoderma gangrenosum        Dose:  100 mg   Take 1 capsule (100 mg) by mouth 2 times daily   Quantity:  60 capsule   Refills:  3       FIBER ADULT GUMMIES 2 G Chew        Dose:  1 chew tab   Take 1 chew tab by mouth daily   Refills:  0       FUROSEMIDE PO        Dose:  20 mg   Take 20 mg by mouth daily as needed   Refills:  0       gabapentin 600 MG tablet   Commonly known as:  NEURONTIN   Used for:  Chronic pain syndrome        Dose:  1200 mg   Take 2 tablets (1,200 mg) by mouth 3 times daily   Quantity:  180 tablet   Refills:  1       hydrOXYzine 25 MG tablet   Commonly known as:  ATARAX   Used for:  Anxiety        Dose:  25-50 mg   Take 1-2 tablets (25-50 mg) by mouth 3 times daily as needed for anxiety   Quantity:  90 tablet   Refills:  2       nicotine 21 MG/24HR 24 hr patch   Commonly known as:  NICODERM CQ        Dose:  1 patch   Place 1 patch onto the skin daily as needed   Refills:  0       nicotine polacrilex 2 MG gum   Commonly known as:  [employer] NICOTINE POLACRILEX   Used for:  Tobacco abuse        Dose:  2 mg   Place 1 each (2  mg) inside cheek every 2 hours as needed for smoking cessation   Quantity:  100 each   Refills:  1       pantoprazole 40 MG EC tablet   Commonly known as:  PROTONIX   Used for:  Gastroesophageal reflux disease without esophagitis        Take 1 tablet twice daily for 2 weeks, then decrease to 40 mg daily morning   Quantity:  60 tablet   Refills:  1       predniSONE 20 MG tablet   Commonly known as:  DELTASONE   Used for:  Pyoderma gangrenosum        Dose:  60 mg   Take 3 tablets (60 mg) by mouth daily   Quantity:  90 tablet   Refills:  3       propranolol 20 MG tablet   Commonly known as:  INDERAL   Used for:  Shakes        Dose:  20 mg   Take 1 tablet (20 mg) by mouth 2 times daily   Quantity:  60 tablet   Refills:  1       senna-docusate 8.6-50 MG per tablet   Commonly known as:  SENOKOT-S;PERICOLACE        Dose:  2 tablet   Take 2 tablets by mouth daily as needed   Refills:  0       sertraline 100 MG tablet   Commonly known as:  ZOLOFT   Used for:  Anxiety        Dose:  100 mg   Take 1 tablet (100 mg) by mouth daily   Quantity:  30 tablet   Refills:  2       temazepam 7.5 MG capsule   Commonly known as:  RESTORIL   Used for:  Drug induced insomnia (H)        Dose:  7.5 mg   Take 1 capsule (7.5 mg) by mouth nightly as needed for sleep   Quantity:  30 capsule   Refills:  0       traMADol 50 MG tablet   Commonly known as:  ULTRAM   Used for:  Chronic pain syndrome        Dose:  50 mg   Take 1 tablet (50 mg) by mouth every 6 hours as needed for pain maximum 3-4 tablet(s) per day   Quantity:  90 tablet   Refills:  0       traZODone 50 MG tablet   Commonly known as:  DESYREL   Used for:  Insomnia, unspecified type        Dose:  150 mg   Take 3 tablets (150 mg) by mouth At Bedtime   Quantity:  90 tablet   Refills:  2                Protect others around you: Learn how to safely use, store and throw away your medicines at www.disposemymeds.org.         Follow-ups after your visit        Your next 10 appointments already  scheduled     Jan 08, 2018  2:30 PM CST   Office Visit with Et Nurse - [facility] ([facility])    [address]  [facility] [id number] [phone]           Bring a current list of meds and any records pertaining to this visit. For Physicals, please bring immunization records and any forms needing to be filled out. Please arrive 10 minutes early to complete paperwork.            Jan 09, 2018  1:00 PM CST   [facility] with Alejandro Belle MD   [facility]    [address] [place]  [facility] [id number] [phone]               Care Instructions        Further instructions from your care team       Continue same cares.  Try the wound cleanser on stream setting to better irrigate wound to clear out the dead slough.      Destini Knott RN, [facility] [phone]     Additional Information About Your Visit        MyChart Information     [employer] gives you secure access to your electronic health record. If you see a primary care provider, you can also send messages to your care team and make appointments. If you have questions, please call your primary care clinic.  If you do not have a primary care provider, please call [phone] and they will assist you.        Care EveryWhere ID     This is your Care EveryWhere ID. This could be used by other organizations to access your [facility] medical records  [id number]         Primary Care Provider Office Phone # Fax #    Jonelle Montoya MICAELA Pelayo [phone]      Equal Access to Services     SALTY AHUMADA : Hadii aad ku hadasho Soomaali, waaxda luqadaha, qaybta kaalmada adeegyada, maribel ngo. So [facility] [phone].    ATENCIÓN: Si habla español, tiene a stephen disposición servicios gratuitos de asistencia lingüística. Llame al [phone].    We comply with applicable federal civil rights laws and [place] laws. We do not  discriminate on the basis of race, color, national origin, age, disability, sex, sexual orientation, or gender identity.            Thank you!     Thank you for choosing [facility] for your care. Our goal is always to provide you with excellent care. Hearing back from our patients is one way we can continue to improve our services. Please take a few minutes to complete the written survey that you may receive in the mail after you visit with us. Thank you!             Medication List: This is a list of all your medications and when to take them. Check marks below indicate your daily home schedule. Keep this list as a reference.      Medications           Morning Afternoon Evening Bedtime As Needed    augmented betamethasone dipropionate 0.05 % cream   Commonly known as:  DIPROLENE-AF   Apply sparingly to affected area twice daily as needed.  Do not apply to face.                                calcium carbonate-vitamin D 600-400 MG-UNIT Chew   Take 1 chew tab by mouth 2 times daily                                cycloSPORINE 100 MG capsule   Commonly known as:  sandIMMUNE   Take 1 capsule (100 mg) by mouth 2 times daily                                FIBER ADULT GUMMIES 2 G Chew   Take 1 chew tab by mouth daily                                FUROSEMIDE PO   Take 20 mg by mouth daily as needed                                gabapentin 600 MG tablet   Commonly known as:  NEURONTIN   Take 2 tablets (1,200 mg) by mouth 3 times daily                                hydrOXYzine 25 MG tablet   Commonly known as:  ATARAX   Take 1-2 tablets (25-50 mg) by mouth 3 times daily as needed for anxiety                                nicotine 21 MG/24HR 24 hr patch   Commonly known as:  NICODERM CQ   Place 1 patch onto the skin daily as needed                                nicotine polacrilex 2 MG gum   Commonly known as:  [employer] NICOTINE POLACRILEX   Place 1 each (2 mg) inside cheek every 2 hours as needed for smoking cessation                                 pantoprazole 40 MG EC tablet   Commonly known as:  PROTONIX   Take 1 tablet twice daily for 2 weeks, then decrease to 40 mg daily morning                                predniSONE 20 MG tablet   Commonly known as:  DELTASONE   Take 3 tablets (60 mg) by mouth daily                                propranolol 20 MG tablet   Commonly known as:  INDERAL   Take 1 tablet (20 mg) by mouth 2 times daily                                senna-docusate 8.6-50 MG per tablet   Commonly known as:  SENOKOT-S;PERICOLACE   Take 2 tablets by mouth daily as needed                                sertraline 100 MG tablet   Commonly known as:  ZOLOFT   Take 1 tablet (100 mg) by mouth daily                                temazepam 7.5 MG capsule   Commonly known as:  RESTORIL   Take 1 capsule (7.5 mg) by mouth nightly as needed for sleep                                traMADol 50 MG tablet   Commonly known as:  ULTRAM   Take 1 tablet (50 mg) by mouth every 6 hours as needed for pain maximum 3-4 tablet(s) per day                                traZODone 50 MG tablet   Commonly known as:  DESYREL   Take 3 tablets (150 mg) by mouth At Bedtime

## 2017-12-19 NOTE — DISCHARGE INSTRUCTIONS
Continue same cares.  Try the wound cleanser on stream setting to better irrigate wound to clear out the dead slough.      Destini Knott RN, [facility] [phone]

## 2017-12-19 NOTE — PROGRESS NOTES
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  Pt states she is now getting almost 2 full days on an ostomy pouch.  She just ordered more Coloplast Brava protective barrier sheets from another supply company and private paid for them as these did not come with order from [employer], they are on back order but hoping will be in soon as she finds this essential to keeping a pouch on.   Did otherwise receive her supplies from [employer].   She is taking prednisone, cyclosporine and calcium and vitamin D as ordered by Dr. Belle and this seems to be helping though not as much as she has hoped, they are still working on an avenue to get Humira covered.  She has stopped taking laxative.  Pt is moving to [place] on Jan 15th.  Shaking is improved on new medication.      History from Friday visit - Pt states she is taking Senakot if her stool thickens because she feels the thicker stool \"pushes the pouch off\" and causes leakage.  In terms of her Humara and topical steroid prescriptions she states she is still waiting for the Humara to be approved by insurance and state the topical steroid cream has been approved and \"is in the mail\".       Patient was referred by MICAELA Roy.      HPI from Initial Visit:  Pt as diagnosed with UC and had colectomy in '09 with temporary ileostomy then a J Pouch.  She had issues with the J Pouch for 7 years eventually deciding to have a take down of this with ileostomy this past July.  Per pt, she has has pouching issues since this start but this eventually was under control and down to a 2-3 day wear time until more recently when she developed PG about 4 weeks ago.  Pt is not sure if a biopsy was done but states they did a \"scraping\" initially and an oral steriod for a week plus local steriod injections then it got worse. She is now needing to replace pouches 2-3 times per day.  Pt is looking to " "establish care here as due to moving will no longer be going to [place] [facility].  She is seeing Dr. Belle in dermatology tomorrow for treatment as well.  Patient is wearing a ConvaTec Esteem Flex Convex pouch [id number] cut to fit up to 35mm with belt.  Has tried rings but seemed possibly worse with rings.  Has been using hydrofera blue over the wound but having issues with this slipping out and into the pouch. Do note that though stoma is oval with below noted dimensions pt is cutting a round opening that does not adequately fit her stoma.      Co-morbidities include anxiety.  She also states she saw a GI specialist in [place] on November 1st due to \"problems with my bottom\" and was scoped.  Concern was for Crohn's though this provider did not think this was the case and per pt he thought is was more of a \"diverson colitis\".     Pouching tried so far:    1.) Aquacel Ag to wound followed by a piece of Coloplast Brava protective sheet to hold it in place. Shana cut to fit convex pouch #[id number] for softer convexity with Cam convex oval ring #[id number] and belt.  This did hold with no leaks when removed following day at dermatology appointment though was very comfortable for pt - too much pressure  2.) Moldable flat convatec [id number] barrier and pouch with a [place] convex oval barrier ring #[id number] with Aquacel Ag in the wound and 1/3rd of a Coloplast Protective barrier sheet over wound to secure the Aquacel Ag.  When initially placed in dermatology clinic on 11/14/17 pt states lasted 2 days though pt has tried again since and leaked is less than one day  3.) Moldable flat convatec barrier  [id number] and pouch with a [place] convex oval barrier ring #[id number] added skin bond cement and used Aquacel ag in wound with 1/2 of a 4 inch Adapt ring over superior peristomal wound area in place of the Coloplast barrier sheet to try as more skin friendly (skin bond cement on barrier ring except wound on on barrier/convex ring).  Leaking " daily.   4.)denudement was crusted with adapt powder and no-ting barrier then tried more flexible light convex oval shaped system, (Coloplast Sensura Lance Soft Convex #38650 cut to fit stoma,) keeping more flush to abd and used a Coloplast Protective barrier sheet around stoma, (opening cut to fit stoma,) rather than just above.  This leaked by evening the day it was applied.  5.)  coloplast barrier wafer cut large enough to encompass the stoma but cover the wound.  Aquacel AG was placed in the wound.   Marlan soft convex pouch cut to size of the barrier opening for some convexity but not too rigid.  Coloplast elastic barrier strips applied around the pouch.  Belly button filled with coloplast paste strip to flatten the area and then covered with mepilex tape.  Belt applied and snugged up tight. Leaked in less than 24 hours  6.) Nu-Hope flat one piece cut to fit oval 1 1/2 to 2 3/4 #1108, this oval shape appears to be a better fit for her abd space and flexibility of this pouch is similar to the Shana cut to fit convex which she feels most comfortable in.  This was placed with a [place] convex barrier ring #[id number] after crusting peristomal skin then placement of a coloplast brava protective barrier sheet cut to fit over wound and around stoma, strip paste to belly button.  Leaked in less than 24 hours    Objective: Patient's ileostomy is located in the right upper quadrant.  She is getting just easily one full day up to almost 2 days out of a pouching system.   Pouch in place now on since yesterday.  This is Aquacel Ag in wound with diprolene cream, strip paste in naval, Coloplast protective barrier sheet then Shana cut to fit convex with belt.  With removal she has a good seal around stoma and would have lasted longer though there was a moderate amount of creamy drainage from wound with Aquacel Ag saturated    Output: stool is loose, watery.    Stoma color: pink  Viability: good  Os/Lumen: at apex, is a loop  stoma with stool loop having good profile though does tip laterally, remainder of stoma is flush  Tone: flaccid at time and stoma becomes concave at times causing stool to well up in area  Profile: minimal and when sitting and especially standing does draw into soft abd more  Shape: oval  MCJ: attached, denudement resolved.  Has PG related breakdown at MC junction at about 2:00, now 1cm x 0.5cm x 0.2cm, pink/clean following irrigation to clean  Stoma Size: 7/8 x 1 1/4  Peristomal Skin: there is a large full thickness wound superior and right up to stoma with no skin bridge to separate.  The wound is debriding well and now 95% pink/clean but not yet granular    Abd: Pt has a very soft abd around the stoma despite being very thin with narrow waist and there is a lot of creasing and loose skin in area with sitting.  Waist is very small so stoma is close to navel and tends to leak toward navel  Pain: still has pain with palpation of wound area but is less    Wound #1 Pyoderma wound above stoma  Specific Dimensions (length x width x depth, in cm) :  2.8cm x 3.3cmx up to 1cm estimated  Tunneling: none noted  Undermining: none  Wound Base: 95% pink, 5% slough  Palpation of the wound bed: firm  Periwound Skin: intact  Temperature: WNL  Drainage: moderate to heavy  Odor: no  Pain: with touch          Assessment:  Ileostomy with already complicated pouching due to soft creasing abd, oval loop stoma with minimal profile and now extremely complicated due to large full thickness necrotic and painful PG ulceration next to stoma, now on a comfortable system with 24+ hour wear      PG wound is large but now mostly clean and no long necrosing with topical diprolene, prednisone and cyclosporine - not yet granulating    Pt is in need of ongoing Medical management of the underlying pyoderma and other issues.        Plan:  Continue current plan of care.  I did try some Microklenz on stream setting to irrigate wound today and this seemed  to help with disruption of remaining slough/cleaning. Was slightly more painful but tolerable per pt.  I did provide a bottle and she will try to continue.      I did call [facility] [employer] regarding pt not receiving Coloplast Brava protective barrier sheets and to request refill on other supplies for one more month, (aside from barrier rings).  Message left. Pt provided with 3 more barrier sheets until her supplies come.       Stoma requires convexity for proper seal though convexity likely contributing to discomfort, need to try to limit convexity as much as able. May benefit from a NuHope Nuflex light convex precut oval though tolerance of this may be difficult until wound improved so have not ordered at this time.  Discussed with pt need to plan ahead in finding an ostomy nurse to see in [place].      Follow-up in about 3 weeks    Face to face time approximately 45minutes.  Destini Knott RN, CWOCN       [phone]

## 2017-12-29 ENCOUNTER — TELEPHONE (OUTPATIENT)
Dept: DERMATOLOGY | Facility: CLINIC | Age: 42
End: 2017-12-29

## 2017-12-29 NOTE — TELEPHONE ENCOUNTER
Please see note below and advise. No openings before 1/9/17.   Olesya CHAVEZ RN BSN PHN  Specialty Clinics

## 2017-12-29 NOTE — TELEPHONE ENCOUNTER
Pt states that she has been on steroids for 2 months now and is not getting any better. Pt is scheduled to be seen on 01/09/18, however, pt would like to be seen sooner if possible (she is moving out of state on 01/15/18). Also pt will be switching insurance on 01/01/18 to [employer] and would like to try to run the RX for Humira through [employer] as her current insurance has denied her twice. Please advise.    Arlene Calvo  [facility]

## 2018-01-03 ENCOUNTER — MEDICAL CORRESPONDENCE (OUTPATIENT)
Dept: HEALTH INFORMATION MANAGEMENT | Facility: CLINIC | Age: 43
End: 2018-01-03

## 2018-01-03 ENCOUNTER — OFFICE VISIT (OUTPATIENT)
Dept: DERMATOLOGY | Facility: CLINIC | Age: 43
End: 2018-01-03
Payer: COMMERCIAL

## 2018-01-03 VITALS
DIASTOLIC BLOOD PRESSURE: 92 MMHG | BODY MASS INDEX: 21.44 KG/M2 | SYSTOLIC BLOOD PRESSURE: 119 MMHG | HEART RATE: 133 BPM | WEIGHT: 122 LBS

## 2018-01-03 DIAGNOSIS — L88 PYODERMA GANGRENOSUM (H): Primary | ICD-10-CM

## 2018-01-03 DIAGNOSIS — Z79.899 HIGH RISK MEDICATION USE: ICD-10-CM

## 2018-01-03 LAB
ALBUMIN SERPL-MCNC: 3.3 G/DL (ref 3.4–5)
ALP SERPL-CCNC: 107 U/L (ref 40–150)
ALT SERPL W P-5'-P-CCNC: 26 U/L (ref 0–50)
ANION GAP SERPL CALCULATED.3IONS-SCNC: 8 MMOL/L (ref 3–14)
AST SERPL W P-5'-P-CCNC: 20 U/L (ref 0–45)
BILIRUB SERPL-MCNC: 0.3 MG/DL (ref 0.2–1.3)
BUN SERPL-MCNC: 24 MG/DL (ref 7–30)
CALCIUM SERPL-MCNC: 8.4 MG/DL (ref 8.5–10.1)
CHLORIDE SERPL-SCNC: 108 MMOL/L (ref 94–109)
CO2 SERPL-SCNC: 23 MMOL/L (ref 20–32)
CREAT SERPL-MCNC: 1.07 MG/DL (ref 0.52–1.04)
ERYTHROCYTE [DISTWIDTH] IN BLOOD BY AUTOMATED COUNT: 21.4 % (ref 10–15)
GFR SERPL CREATININE-BSD FRML MDRD: 56 ML/MIN/1.7M2
GLUCOSE SERPL-MCNC: 111 MG/DL (ref 70–99)
HCT VFR BLD AUTO: 34.9 % (ref 35–47)
HGB BLD-MCNC: 10.8 G/DL (ref 11.7–15.7)
MCH RBC QN AUTO: 27.3 PG (ref 26.5–33)
MCHC RBC AUTO-ENTMCNC: 30.9 G/DL (ref 31.5–36.5)
MCV RBC AUTO: 88 FL (ref 78–100)
PLATELET # BLD AUTO: 150 10E9/L (ref 150–450)
POTASSIUM SERPL-SCNC: 3.7 MMOL/L (ref 3.4–5.3)
PROT SERPL-MCNC: 7.2 G/DL (ref 6.8–8.8)
RBC # BLD AUTO: 3.96 10E12/L (ref 3.8–5.2)
SODIUM SERPL-SCNC: 139 MMOL/L (ref 133–144)
WBC # BLD AUTO: 12 10E9/L (ref 4–11)

## 2018-01-03 PROCEDURE — 36415 COLL VENOUS BLD VENIPUNCTURE: CPT | Performed by: DERMATOLOGY

## 2018-01-03 PROCEDURE — 85027 COMPLETE CBC AUTOMATED: CPT | Performed by: DERMATOLOGY

## 2018-01-03 PROCEDURE — 99213 OFFICE O/P EST LOW 20 MIN: CPT | Performed by: DERMATOLOGY

## 2018-01-03 PROCEDURE — 80053 COMPREHEN METABOLIC PANEL: CPT | Performed by: DERMATOLOGY

## 2018-01-03 RX ORDER — OXYCODONE AND ACETAMINOPHEN 7.5; 325 MG/1; MG/1
1 TABLET ORAL EVERY 6 HOURS PRN
Qty: 30 TABLET | Refills: 0 | Status: SHIPPED | OUTPATIENT
Start: 2018-01-03

## 2018-01-03 RX ORDER — CYCLOSPORINE 100 MG/1
100 CAPSULE, GELATIN COATED ORAL 2 TIMES DAILY
Qty: 60 CAPSULE | Refills: 3 | Status: SHIPPED | OUTPATIENT
Start: 2018-01-03

## 2018-01-03 NOTE — MR AVS SNAPSHOT
After Visit Summary   1/3/2018    Yuni Lam    MRN: [id number]           Patient Information     Date Of Birth          1975        Visit Information        Provider Department      1/3/2018 1:00 PM Alejandro Belle MD [facility]        Today's Diagnoses     Pyoderma gangrenosum    -  1    High risk medication use           Follow-ups after your visit        Your next 10 appointments already scheduled     Jan 08, 2018  2:30 PM CST   Office Visit with Et Nurse - [facility] ([facility])    [address] [place] [address] [phone]           Bring a current list of meds and any records pertaining to this visit. For Physicals, please bring immunization records and any forms needing to be filled out. Please arrive 10 minutes early to complete paperwork.            Jan 09, 2018  1:00 PM CST   Return Visit with Alejandro Belle MD   [facility] ([facility]    [address] [address] [phone]              Who to contact     If you have questions or need follow up information about today's clinic visit or your schedule please contact [facility] directly at [phone].  Normal or non-critical lab and imaging results will be communicated to you by MyChart, letter or phone within 4 business days after the clinic has received the results. If you do not hear from us within 7 days, please contact the clinic through MyChart or phone. If you have a critical or abnormal lab result, we will notify you by phone as soon as possible.  Submit refill requests through [employer] or call your pharmacy and they will forward the refill request to us. Please allow 3 business days for your refill to be completed.          Additional Information About Your Visit        [employer] Information     [employer] gives you secure access to your electronic health record. If you  see a primary care provider, you can also send messages to your care team and make appointments. If you have questions, please call your primary care clinic.  If you do not have a primary care provider, please call [phone] and they will assist you.        Care EveryWhere ID     This is your Care EveryWhere ID. This could be used by other organizations to access your [facility] medical records  [id number]        Your Vitals Were     Pulse BMI (Body Mass Index)                133 21.44 kg/m2           Blood Pressure from Last 3 Encounters:   01/03/18 (!) 119/92   12/12/17 (!) 150/93   12/04/17 110/62    Weight from Last 3 Encounters:   01/03/18 55.3 kg (122 lb)   12/04/17 45 kg (99 lb 4.8 oz)   11/10/17 46.7 kg (102 lb 14.4 oz)              We Performed the Following     CBC with platelets     Comprehensive metabolic panel          Today's Medication Changes          These changes are accurate as of: 1/3/18  1:35 PM.  If you have any questions, ask your nurse or doctor.               Start taking these medicines.        Dose/Directions    adalimumab 40 MG/0.8ML prefilled syringe kit   Commonly known as:  HUMIRA   Used for:  Pyoderma gangrenosum   Started by:  Alejandro Belle MD        80 mg Day 1 and Day 2; 80 mg on Day 15; (Day 29), 40 mg every other week   Quantity:  6 kit   Refills:  3       oxyCODONE-acetaminophen 7.5-325 MG per tablet   Commonly known as:  PERCOCET   Used for:  Pyoderma gangrenosum, High risk medication use   Started by:  Alejandro Belle MD        Dose:  1 tablet   Take 1 tablet by mouth every 6 hours as needed for pain maximum 10 tablet(s) per day   Quantity:  30 tablet   Refills:  0         These medicines have changed or have updated prescriptions.        Dose/Directions    predniSONE 20 MG tablet   Commonly known as:  DELTASONE   This may have changed:    - how much to take  - when to take this   Used for:  Pyoderma gangrenosum        Dose:  60 mg   Take 3 tablets (60  mg) by mouth daily   Quantity:  90 tablet   Refills:  3            Where to get your medicines      These medications were sent to [facility] - [place], [place] - [address]   [address] [facility] [address]    Hours:  Mon-Fri 8:30am-5:00pm Toll Free [phone] Phone:  [phone]     adalimumab 40 MG/0.8ML prefilled syringe kit         These medications were sent to [facility] [address]   [address] [facility] [address]     Phone:  [phone]     cycloSPORINE 100 MG capsule         Some of these will need a paper prescription and others can be bought over the counter.  Ask your nurse if you have questions.     Bring a paper prescription for each of these medications     oxyCODONE-acetaminophen 7.5-325 MG per tablet                Primary Care Provider Office Phone # Fax #    Jonelle Montoya Pierce, APRN [phone] [address] [facility] [address]        Equal Access to Services     SALTY AHUMADA : Hadii suleman alicea hadasho Sofrancoise, waaxda luqadaha, qaybta kaalmada ademauricio, maribel ngo. So [facility] [phone].    ATENCIÓN: Si habla español, tiene a stephen disposición servicios gratuitos de asistencia lingüística. Andrew al [phone].    We comply with applicable federal civil rights laws and [place] laws. We do not discriminate on the basis of race, color, national origin, age, disability, sex, sexual orientation, or gender identity.            Thank you!     Thank you for choosing [facility]  for your care. Our goal is always to provide you with excellent care. Hearing back from our patients is one way we can continue to improve our services. Please take a few minutes to complete the written survey that you may receive in the mail after your visit with us. Thank you!             Your Updated Medication List - Protect others around you: Learn how to  safely use, store and throw away your medicines at www.disposemymeds.org.          This list is accurate as of: 1/3/18  1:35 PM.  Always use your most recent med list.                   Brand Name Dispense Instructions for use Diagnosis    adalimumab 40 MG/0.8ML prefilled syringe kit    HUMIRA    6 kit    80 mg Day 1 and Day 2; 80 mg on Day 15; (Day 29), 40 mg every other week    Pyoderma gangrenosum       augmented betamethasone dipropionate 0.05 % cream    DIPROLENE-AF    100 g    Apply sparingly to affected area twice daily as needed.  Do not apply to face.    Pyoderma gangrenosum       calcium carbonate-vitamin D 600-400 MG-UNIT Chew     180 tablet    Take 1 chew tab by mouth 2 times daily    Pyoderma gangrenosa       cycloSPORINE 100 MG capsule    sandIMMUNE    60 capsule    Take 1 capsule (100 mg) by mouth 2 times daily    Pyoderma gangrenosum       FIBER ADULT GUMMIES 2 G Chew      Take 1 chew tab by mouth daily        FUROSEMIDE PO      Take 20 mg by mouth daily as needed        gabapentin 600 MG tablet    NEURONTIN    180 tablet    Take 2 tablets (1,200 mg) by mouth 3 times daily    Chronic pain syndrome       hydrOXYzine 25 MG tablet    ATARAX    90 tablet    Take 1-2 tablets (25-50 mg) by mouth 3 times daily as needed for anxiety    Anxiety       nicotine 21 MG/24HR 24 hr patch    NICODERM CQ     Place 1 patch onto the skin daily as needed        nicotine polacrilex 2 MG gum    [employer] NICOTINE POLACRILEX    100 each    Place 1 each (2 mg) inside cheek every 2 hours as needed for smoking cessation    Tobacco abuse       oxyCODONE-acetaminophen 7.5-325 MG per tablet    PERCOCET    30 tablet    Take 1 tablet by mouth every 6 hours as needed for pain maximum 10 tablet(s) per day    Pyoderma gangrenosum, High risk medication use       pantoprazole 40 MG EC tablet    PROTONIX    60 tablet    Take 1 tablet twice daily for 2 weeks, then decrease to 40 mg daily morning    Gastroesophageal reflux disease without  esophagitis       predniSONE 20 MG tablet    DELTASONE    90 tablet    Take 3 tablets (60 mg) by mouth daily    Pyoderma gangrenosum       propranolol 20 MG tablet    INDERAL    60 tablet    Take 1 tablet (20 mg) by mouth 2 times daily    Shakes       senna-docusate 8.6-50 MG per tablet    SENOKOT-S;PERICOLACE     Take 2 tablets by mouth daily as needed        sertraline 100 MG tablet    ZOLOFT    30 tablet    Take 1 tablet (100 mg) by mouth daily    Anxiety       temazepam 7.5 MG capsule    RESTORIL    30 capsule    Take 1 capsule (7.5 mg) by mouth nightly as needed for sleep    Drug induced insomnia (H)       traMADol 50 MG tablet    ULTRAM    90 tablet    Take 1 tablet (50 mg) by mouth every 6 hours as needed for pain maximum 3-4 tablet(s) per day    Chronic pain syndrome       traZODone 50 MG tablet    DESYREL    90 tablet    Take 3 tablets (150 mg) by mouth At Bedtime    Insomnia, unspecified type

## 2018-01-03 NOTE — LETTER
1/3/2018         RE: Yuni Lam  [id number] [address] [facility] [address]        Dear Colleague,    Thank you for referring your patient, Yuni Lam, to the [facility]. Please see a copy of my visit note below.    Yuni Lam is a 42 year old year old female patient here today for f/u PG.  We couldn't get humira covered. She is on CsA and prednisone.   Last visit pain and odor were much better with some healing, we tapered off of pred and things have gotten worse.  She notes she has gotten on a new HMO.  She would like to see if we can try and get humira covered.  She does not want to go back on higher dose steroids.  Associated symptoms: pain is worse. Patient has no other skin complaints today.  Remainder of the HPI, Meds, PMH, Allergies, FH, and SH was reviewed in chart.        Past Medical History         Past Medical History:   Diagnosis Date     Abnormal Papanicolaou smear of cervix and cervical HPV      LEEP     Allergic rhinitis due to pollen       Depressive disorder, not elsewhere classified       Depression (non-psychotic)     Ulcerative colitis              Past Surgical History          Past Surgical History:   Procedure Laterality Date     C ANESTH, SECTION         C TOTAL ABDOM HYSTERECTOMY         Hysterectomy, Total Abdominal- prolapsed uterus     COLECTOMY        Ulcerative colitis     HC REMOVAL OF OVARY/TUBE(S)         Salpingo-Oophorectomy, Bilateral             Family History           Family History   Problem Relation Age of Onset     Alcohol/Drug Father       Coronary Artery Disease Early Onset Father       CANCER Maternal Grandmother         lung     OSTEOPOROSIS Maternal Grandmother       CEREBROVASCULAR DISEASE Maternal Grandmother       DIABETES Sister         gestational     Emphysema Mother               Social History    Social History            Social History     Marital status: Legally        Spouse name: N/A      Number of children: N/A     Years of education: N/A          Occupational History     Not on file.             Social History Main Topics     Smoking status: Current Every Day Smoker       Packs/day: 0.50       Years: 15.00     Smokeless tobacco: Never Used     Alcohol use 6.0 oz/week      Drug use: No     Sexual activity: Yes       Partners: Male       Birth control/ protection: Surgical           Other Topics Concern     Not on file      Social History Narrative             Encounter Medications           Outpatient Encounter Prescriptions as of 12/12/2017   Medication Sig Dispense Refill     propranolol (INDERAL) 10 MG tablet Take 1 tablet (10 mg) by mouth 2 times daily 60 tablet 1     temazepam (RESTORIL) 7.5 MG capsule Take 1 capsule (7.5 mg) by mouth nightly as needed for sleep 30 capsule 0     nicotine (NICODERM CQ) 21 MG/24HR 24 hr patch Place 1 patch onto the skin daily as needed         FIBER ADULT GUMMIES 2 G CHEW Take 1 chew tab by mouth daily         senna-docusate (SENOKOT-S;PERICOLACE) 8.6-50 MG per tablet Take 2 tablets by mouth daily as needed         ciprofloxacin (CIPRO) 500 MG tablet Take 500 mg by mouth 2 times daily         calcium carbonate-vitamin D 600-400 MG-UNIT CHEW Take 1 chew tab by mouth 2 times daily 180 tablet 3     predniSONE (DELTASONE) 20 MG tablet Take 3 tablets (60 mg) by mouth daily (Patient taking differently: Take 20 mg by mouth 3 times daily ) 90 tablet 3     cycloSPORINE (SANDIMMUNE) 100 MG capsule Take 1 capsule (100 mg) by mouth 2 times daily 60 capsule 3     hydrOXYzine (ATARAX) 25 MG tablet Take 1-2 tablets (25-50 mg) by mouth 3 times daily as needed for anxiety 90 tablet 2     nicotine polacrilex (NICORETTE) 2 MG gum Place 1 each (2 mg) inside cheek every 2 hours as needed for smoking cessation 100 each 1     augmented betamethasone dipropionate (DIPROLENE-AF) 0.05 % cream Apply sparingly to affected area twice daily as needed.  Do not apply to face. 100 g 3      PANTOPRAZOLE SODIUM PO Take 40 mg by mouth daily         FUROSEMIDE PO Take 20 mg by mouth daily as needed          traMADol (ULTRAM) 50 MG tablet Take 1 tablet (50 mg) by mouth every 6 hours as needed for pain maximum 3-4 tablet(s) per day 90 tablet 0     traZODone (DESYREL) 50 MG tablet Take 3 tablets (150 mg) by mouth At Bedtime 90 tablet 2     sertraline (ZOLOFT) 100 MG tablet Take 1 tablet (100 mg) by mouth daily 30 tablet 2     gabapentin (NEURONTIN) 600 MG tablet Take 2 tablets (1,200 mg) by mouth 3 times daily 180 tablet 1      No facility-administered encounter medications on file as of 12/12/2017.                      Review Of Systems  Skin: As above  Eyes: negative  Ears/Nose/Throat: negative  Respiratory: No shortness of breath, dyspnea on exertion, cough, or hemoptysis  Cardiovascular: negative  Gastrointestinal: negative  Genitourinary: negative  Musculoskeletal: negative  Neurologic: negative  Psychiatric: negative  Hematologic/Lymphatic/Immunologic: negative  Endocrine: negative        O:                                   NAD, WDWN, Alert & Oriented, Mood & Affect wnl, Vitals stable                                         Here today alone                                         BP (!) 150/93  Pulse 60  SpO2 100%                                         General appearance normal                                         Vitals stable                                         Alert, oriented and in no acute distress      ostomoy in place, significant expansion of wound with underminging of superior portion of wound, tender             The remainder of expanded problem focused exam was unremarkable; the following areas were examined:  scalp/hair, conjunctiva/lids, face, neck, lips                                                       Eyes: Conjunctivae/lids:Normal                                                     ENT: Lips, buccal mucosa, tongue: normal                                                     MSK:Normal                                                    Cardiovascular: peripheral edema none                                                    Pulm: Breathing Normal                                                    Neuro/Psych: Orientation:Normal; Mood/Affect:Normal        A/P:  1. PG  Check cbc and cmp  She does not want to increase pred  Would stay at 20 for now with calcium and vit d  Will increase CsA her weight is 122 punds goal 3mg/kg  She is only taking one pill daily  Go back to two pills daily     Will reorder humira to speciliaty pahrmacy to see if we can get approval since she is failing topicals, oral steroids, CsA  Return to clinic one week  Cont wound care       Again, thank you for allowing me to participate in the care of your patient.        Sincerely,        Alejandro Belle MD

## 2018-01-03 NOTE — PROGRESS NOTES
Yuni Lam is a 42 year old year old female patient here today for f/u PG.  We couldn't get humira covered. She is on CsA and prednisone.  Last visit pain and odor were much better with some healing, we tapered off of pred and things have gotten worse.  She notes she has gotten on a new HMO.  She would like to see if we can try and get humira covered.  She does not want to go back on higher dose steroids.  Associated symptoms: pain is worse. Patient has no other skin complaints today.  Remainder of the HPI, Meds, PMH, Allergies, FH, and SH was reviewed in chart.        Past Medical History         Past Medical History:   Diagnosis Date     Abnormal Papanicolaou smear of cervix and cervical HPV      LEEP     Allergic rhinitis due to pollen       Depressive disorder, not elsewhere classified       Depression (non-psychotic)     Ulcerative colitis              Past Surgical History          Past Surgical History:   Procedure Laterality Date     C ANESTH, SECTION         C TOTAL ABDOM HYSTERECTOMY         Hysterectomy, Total Abdominal- prolapsed uterus     COLECTOMY        Ulcerative colitis     HC REMOVAL OF OVARY/TUBE(S)         Salpingo-Oophorectomy, Bilateral             Family History           Family History   Problem Relation Age of Onset     Alcohol/Drug Father       Coronary Artery Disease Early Onset Father       CANCER Maternal Grandmother         lung     OSTEOPOROSIS Maternal Grandmother       CEREBROVASCULAR DISEASE Maternal Grandmother       DIABETES Sister         gestational     Emphysema Mother               Social History    Social History            Social History     Marital status: Legally        Spouse name: N/A     Number of children: N/A     Years of education: N/A          Occupational History     Not on file.             Social History Main Topics     Smoking status: Current Every Day Smoker       Packs/day: 0.50       Years: 15.00     Smokeless tobacco:  Never Used     Alcohol use 6.0 oz/week      Drug use: No     Sexual activity: Yes       Partners: Male       Birth control/ protection: Surgical           Other Topics Concern     Not on file      Social History Narrative             Encounter Medications           Outpatient Encounter Prescriptions as of 12/12/2017   Medication Sig Dispense Refill     propranolol (INDERAL) 10 MG tablet Take 1 tablet (10 mg) by mouth 2 times daily 60 tablet 1     temazepam (RESTORIL) 7.5 MG capsule Take 1 capsule (7.5 mg) by mouth nightly as needed for sleep 30 capsule 0     nicotine (NICODERM CQ) 21 MG/24HR 24 hr patch Place 1 patch onto the skin daily as needed         FIBER ADULT GUMMIES 2 G CHEW Take 1 chew tab by mouth daily         senna-docusate (SENOKOT-S;PERICOLACE) 8.6-50 MG per tablet Take 2 tablets by mouth daily as needed         ciprofloxacin (CIPRO) 500 MG tablet Take 500 mg by mouth 2 times daily         calcium carbonate-vitamin D 600-400 MG-UNIT CHEW Take 1 chew tab by mouth 2 times daily 180 tablet 3     predniSONE (DELTASONE) 20 MG tablet Take 3 tablets (60 mg) by mouth daily (Patient taking differently: Take 20 mg by mouth 3 times daily ) 90 tablet 3     cycloSPORINE (SANDIMMUNE) 100 MG capsule Take 1 capsule (100 mg) by mouth 2 times daily 60 capsule 3     hydrOXYzine (ATARAX) 25 MG tablet Take 1-2 tablets (25-50 mg) by mouth 3 times daily as needed for anxiety 90 tablet 2     nicotine polacrilex (NICORETTE) 2 MG gum Place 1 each (2 mg) inside cheek every 2 hours as needed for smoking cessation 100 each 1     augmented betamethasone dipropionate (DIPROLENE-AF) 0.05 % cream Apply sparingly to affected area twice daily as needed.  Do not apply to face. 100 g 3     PANTOPRAZOLE SODIUM PO Take 40 mg by mouth daily         FUROSEMIDE PO Take 20 mg by mouth daily as needed          traMADol (ULTRAM) 50 MG tablet Take 1 tablet (50 mg) by mouth every 6 hours as needed for pain maximum 3-4 tablet(s) per day 90 tablet 0      traZODone (DESYREL) 50 MG tablet Take 3 tablets (150 mg) by mouth At Bedtime 90 tablet 2     sertraline (ZOLOFT) 100 MG tablet Take 1 tablet (100 mg) by mouth daily 30 tablet 2     gabapentin (NEURONTIN) 600 MG tablet Take 2 tablets (1,200 mg) by mouth 3 times daily 180 tablet 1      No facility-administered encounter medications on file as of 12/12/2017.                      Review Of Systems  Skin: As above  Eyes: negative  Ears/Nose/Throat: negative  Respiratory: No shortness of breath, dyspnea on exertion, cough, or hemoptysis  Cardiovascular: negative  Gastrointestinal: negative  Genitourinary: negative  Musculoskeletal: negative  Neurologic: negative  Psychiatric: negative  Hematologic/Lymphatic/Immunologic: negative  Endocrine: negative        O:                                   NAD, WDWN, Alert & Oriented, Mood & Affect wnl, Vitals stable                                         Here today alone                                         BP (!) 150/93  Pulse 60  SpO2 100%                                         General appearance normal                                         Vitals stable                                         Alert, oriented and in no acute distress      ostomoy in place, significant expansion of wound with underminging of superior portion of wound, tender             The remainder of expanded problem focused exam was unremarkable; the following areas were examined:  scalp/hair, conjunctiva/lids, face, neck, lips                                                       Eyes: Conjunctivae/lids:Normal                                                     ENT: Lips, buccal mucosa, tongue: normal                                                    MSK:Normal                                                    Cardiovascular: peripheral edema none                                                    Pulm: Breathing Normal                                                    Neuro/Psych:  Orientation:Normal; Mood/Affect:Normal        A/P:  1. PG  Check cbc and cmp  She does not want to increase pred  Would stay at 20 for now with calcium and vit d  Will increase CsA her weight is 122 punds goal 3mg/kg  She is only taking one pill daily  Go back to two pills daily     Will reorder humira to speciliaty pahrmacy to see if we can get approval since she is failing topicals, oral steroids, CsA  Return to clinic one week  Cont wound care

## 2018-01-03 NOTE — NURSING NOTE
"Initial BP (!) 119/92  Pulse 133  Wt 55.3 kg (122 lb)  BMI 21.44 kg/m2 Estimated body mass index is 21.44 kg/(m^2) as calculated from the following:    Height as of 12/4/17: 1.607 m (5' 3.25\").    Weight as of this encounter: 55.3 kg (122 lb). .      "

## 2018-01-04 ENCOUNTER — TELEPHONE (OUTPATIENT)
Dept: FAMILY MEDICINE | Facility: CLINIC | Age: 43
End: 2018-01-04

## 2018-01-08 ENCOUNTER — HOSPITAL ENCOUNTER (OUTPATIENT)
Dept: WOUND CARE | Facility: CLINIC | Age: 43
Discharge: HOME | End: 2018-01-08
Attending: NURSE PRACTITIONER | Admitting: NURSE PRACTITIONER
Payer: COMMERCIAL

## 2018-01-08 ENCOUNTER — TELEPHONE (OUTPATIENT)
Dept: DERMATOLOGY | Facility: CLINIC | Age: 43
End: 2018-01-08

## 2018-01-08 PROCEDURE — G0463 HOSPITAL OUTPT CLINIC VISIT: HCPCS

## 2018-01-08 NOTE — PROGRESS NOTES
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  Pt states she is worse again, not even getting 1 day out of pouching due to leaking.  She has gained a very significant amount of weight from the prednisone and abd is protruding and pushing pouch away from abd.  Wound is larger and she cannot isolate it anymore, stool always gets into it.  States she changed her insurance companies but Humara was STILL denied.  Is working with Dr. Belle on this.    Pt is moving to [place] on Jan 15th, does not have a provider lined up to transition care to.           Patient was referred by MICAELA Roy.      HPI from Initial Visit:  Pt as diagnosed with UC and had colectomy in '09 with temporary ileostomy then a J Pouch.  She had issues with the J Pouch for 7 years eventually deciding to have a take down of this with ileostomy this past July.  Per pt, she has has pouching issues since this start but this eventually was under control and down to a 2-3 day wear time until more recently when she developed PG about 4 weeks ago.  Pt is not sure if a biopsy was done but states they did a \"scraping\" initially and an oral steriod for a week plus local steriod injections then it got worse. She is now needing to replace pouches 2-3 times per day.  Pt is looking to establish care here as due to moving will no longer be going to [facility].  She is seeing Dr. Belle in dermatology tomorrow for treatment as well.  Patient is wearing a ConvaTec Esteem Flex Convex pouch [id number] cut to fit up to 35mm with belt.  Has tried rings but seemed possibly worse with rings.  Has been using hydrofera blue over the wound but having issues with this slipping out and into the pouch. Do note that though stoma is oval with below noted dimensions pt is cutting a round opening that does not adequately fit her stoma.      Co-morbidities include anxiety.  She also states she saw a GI " "specialist in [place] on November 1st due to \"problems with my bottom\" and was scoped.  Concern was for Crohn's though this provider did not think this was the case and per pt he thought is was more of a \"diverson colitis\".     Pouching tried so far:    1.) Aquacel Ag to wound followed by a piece of Coloplast Brava protective sheet to hold it in place. Shana cut to fit convex pouch #[id number] for softer convexity with Cam convex oval ring #[id number] and belt.  This did hold with no leaks when removed following day at dermatology appointment though was very comfortable for pt - too much pressure  2.) Moldable flat convatec [id number] barrier and pouch with a Cam convex oval barrier ring #[id number] with Aquacel Ag in the wound and 1/3rd of a Coloplast Protective barrier sheet over wound to secure the Aquacel Ag.  When initially placed in dermatology clinic on 11/14/17 pt states lasted 2 days though pt has tried again since and leaked is less than one day  3.) Moldable flat convatec barrier  [id number] and pouch with a [place] convex oval barrier ring #[id number] added skin bond cement and used Aquacel ag in wound with 1/2 of a 4 inch Adapt ring over superior peristomal wound area in place of the Coloplast barrier sheet to try as more skin friendly (skin bond cement on barrier ring except wound on on barrier/convex ring).  Leaking daily.   4.)denudement was crusted with adapt powder and no-ting barrier then tried more flexible light convex oval shaped system, (Coloplast Sensura Oberon Soft Convex #[id number] cut to fit stoma,) keeping more flush to abd and used a Coloplast Protective barrier sheet around stoma, (opening cut to fit stoma,) rather than just above.  This leaked by evening the day it was applied.  5.)  coloplast barrier wafer cut large enough to encompass the stoma but cover the wound.  Aquacel AG was placed in the wound.   [place] soft convex pouch cut to size of the barrier opening for some convexity but not too rigid.  " Coloplast elastic barrier strips applied around the pouch.  Belly button filled with coloplast paste strip to flatten the area and then covered with mepilex tape.  Belt applied and snugged up tight. Leaked in less than 24 hours  6.) Nu-Julia flat one piece cut to fit oval 1 1/2 to 2 3/4 #1108, this oval shape appears to be a better fit for her abd space and flexibility of this pouch is similar to the Shana cut to fit convex which she feels most comfortable in.  This was placed with a Bannister convex barrier ring #[id number] after crusting peristomal skin then placement of a coloplast brava protective barrier sheet cut to fit over wound and around stoma, strip paste to belly button.  Leaked in less than 24 hours  7.) Aquacel Ag in wound with diprolene cream, strip paste in naval, Coloplast protective barrier sheet then Shana cut to fit convex with belt. This was working well with almost 2 days hold then wound began to worsen and enlarge and pt gaining weight and now leaking in less than 24 hours.    Objective: Patient's ileostomy is located in the right upper quadrant.  Pouch in place now on since last night, this is Aquacel Ag in wound with diprolene cream, strip paste in naval, Coloplast protective barrier sheet then Shana cut to fit convex with belt, she also added a piece of a ring superior to wound as pouch pushing away off skin here due to weight gain.  With removal she was leaking almost all the way around  Output: stool is loose, VERY watery with almost constant liquid stream, pt did not eat anything today and only drank coffee    Stoma color: pink  Viability: good  Os/Lumen: Loop stoma with stool and at apex with good profile though does hang low and laterally, remainder of stoma is flush  Tone: flaccid at time and stoma becomes concave at times causing stool to well up in area  Profile: good but stoma draws into fold of now larger and soft abd  Shape: oval  MCJ: PG related breakdown at MC junction over entire  superior stoma and down medial side, now 4cm x 4cm x 1cm, wound is pink non-granular and about 20% slough  Stoma Size: 7/8 x 1 1/4  Peristomal Skin:  large full thickness wound superior and right up to stoma with no skin bridge to separate.     Abd: very soft and rounded with stoma and wound in crease.  Waist otherwise small with stoma close to navel leading to tendancy to leak toward navel  Pain: still has pain with palpation of wound area but is less    Wound #1 Pyoderma wound above stoma  Specific Dimensions (length x width x depth, in cm) :  4cm x 4cmx 1cm estimated  Tunneling: none noted  Undermining: none  Wound Base: 80% pink, 20% slough  Palpation of the wound bed: firm  Periwound Skin: intact  Temperature: WNL  Drainage: moderate to heavy though hard to tell d/t stool contamination of area  Odor: no  Pain: with touch              Assessment:  Ileostomy with extremely complicated pouching due to soft creasing abd, oval loop stoma with minimal profile, large full thickness necrotic and painful PG ulceration next to stoma with no skin bridge, pouching getting worse due to increasing wound size and pt's weight gain      PG not responding to treatment and insurance denying Humara    Pt is in need of ongoing Medical management of the underlying pyoderma     Plan:  Wound irrigated with Microklenz.  Attempted pouching today with Flat cut to fit [place] # [id number] that was cut to fit both wound and stoma with additional Coloplast Brava protective Skin barrier.  Was going to add skin cement but stool too continuous today so not able to keep dry long enough to use.  Pt then given a 9inch 3 panel abd binder to try to hold back soft abd/prevent deep creasing at not able to use belt with this pouch, she will release it regularly to be sure stool is moving into distal pouch.       Pt provided with 4 of the above pouches, will do phone call follow-up tomorrow. She is seeing Dr. Belle again tomorrow.  May need GI or  colorectal surgery consult?  Urgently needs to get PG under control as wound is quite significant and deep and may not be able to keep pouch on in current state.      Plan is complicated by fact that pt is moving in a week.  I have asked here if she can stay with someone here long enough to get this under control as moving will really complicate this, she might be able to.      Pt may benefit from a non-adhesive pouching system but I will not be able to get one ordered and here prior to her needing to move if this is still her plan.             Face to face time approximately 60minutes.  Destini Knott RN, CWOCN       [phone]

## 2018-01-08 NOTE — TELEPHONE ENCOUNTER
PRIOR AUTHORIZATION DENIED    Medication: HUMIRA-DENIED    Denial Date: 1/5/2018    Denial Rational: STATING HUMIRA REQUIRES A DX OF ONE OF THE FOLLOWING ANKYLOSING SPONDYLITIS, CROHN'S DISEASE, PLAQUE PSORIASIS ETC... PT DX IS NOT COVERED. SEE DENIAL LETTER    Appeal Information: Appeals can be done over the phone by calling [phone] or by faxing [phone]. Please let us know if you would like us to appeal this denial. If appealing, please provide a letter of medical [facility] Prior Authorization Team   Phone: [phone]  Fax: [phone]

## 2018-01-08 NOTE — LETTER
[facility]    01/09/18      [facility] [place]  [address] [address] [phone]     [place]        Re: Aly Bueno   1975   [address]  [place] [address]   [phone] (home)   Coverage information:     Subscriber: [id number] ALY BUENO     Rel to sub: 01 - Self     Member ID: [id number]     Payor: 16-MEDICAID [place] Ph: [phone]     Benefit [place] [id number]-[place] [facility] [place] Ph: [phone]       To Whom it may concvern:  I am writing to provide additional information to support my claim for the treatment of Aly Bueno with Humira  (adalimumab) for diversion colitis, Chron's disease and pyoderma gangrenosum.  In brief, treatment of Aly with Humira  is medically appropriate and necessary and should be a covered treatment. Below, this letter outlines Aly's medical history, prognosis, and treatment rationale.    Aly has a significant medical history including Chron's disease and diversion colitis.  Aly came under my care on 11/14/2017.  She was referred from the wound care clinic for a non healing wound on her ostomy site.  On that visit she was diagnosed with pyoderma gangrensoum (peristomal pyoderma).  Pyoderma gangrenosum is a rare but serious ulcerating skin disease, the treatment of which is mostly empirical. Pyoderma can present to a variety of health professionals and several variants exist that may not be recognised immediately. This can delay the diagnosis and have serious clinical consequences. Peristomal pyoderma, which occurs close to abdominal stomas, comprises about 15% of all cases of pyoderma. Most of these patients have inflammatory bowel disease, but peristomal pyoderma can occur in patients who have had an ileostomy or colostomy for malignancy or diverticular disease.  About half of the cases of pyoderma gangrenosum are associated with  underlying systemic conditions, such as inflammatory bowel disease, arthritis, and haematological malignancies.  About 30% of cases occur in patients with inflammatory bowel disease.     Complications of pyoderma gangrenosum include infection, scarring, uncontrolled pain, depression, loss of mobility and involvement of other organs.   No single, specific treatment exists and few controlled trials of treatment have been done. To date Yuni has failed topical and oral steroids.       Given the patient s history, condition, the FDAs approval of Humira for the treatment of Chron's disease as well as the published data supporting use of Humira , for pyoderma gangrenosum, I believe treatment of Yuni with Humira  is warranted, appropriate and medically necessary.       Please contact me if I can provide you with any additional information. I look forward to receiving your timely response and approval of this claim.  If you are unable to approve this claim I am requesting that the case be reviewed by a medical professional in the field.         Sincerely,   Alejandro Belle MD PhD FAAD FACMS      References:   1. Ted JONES, Misha P, Anisa S. Early diagnosis of pyoderma gangrenosum is important to prevent disfigurement. BMJ 1998;316: 52-3. [PMC free article] [PubMed]  2. Christiano ML, Hermilo JM, Flavia JL, Fleischer AB Jr, Poly WL, Chauncey VIKTORIA. Pyoderma gangrenosum. A comparison of typical and atypical forms with an emphasis on time to remission. Case review of 86 patients from 2 institutions. [facility]) 2000;79: 37-46. [PubMed]  3. Edvin Forbes, Romel PRECIADO, Hermelindo STRONG, Iftikhar MOSS. Parastomal pyoderma gangrenosum: clinical features and management. J Am Acad Dermatol 2000;42: 992-1002. [PubMed]  4. Edvin Forbes, Iftikhar MOSS, Romel PRECIADO. The spectrum of skin disorders in abdominal stoma patients. Br J Dermatol 2000;143: 1248-60. [PubMed]  5. Kathleen Forbes Smith AJ, Mendelsohn S, Romel PRECIADO, Iftikhar MOSS.  Topical tacrolimus in the management of peristomal pyoderma gangrenosum. J Dermatol Treat 2001;12: 13-7. [PubMed]  6. Chris NA, Yousifn JE, Monaco D, Vasey FB. Vesiculopustular eruption of ulcerative colitis. Arch Dermatol 1983;119: 664-9. [PubMed]  7. Chauncey BLUM, Jeramy TY. Vesiculopustular eruption in a patient with ulcerative colitis. Pyoderma gangrenosum. Arch Dermatol 1985;121: 399, 402. [PubMed]  8. Juan KING, Juan ZHANG. Pyoderma gangrenosum. Clin Dermatol 2000; 18: 283-93. [PubMed]  9. Hay CR, Messenger AG, Kofi DW, Bleshainaen SS, Melvin DA. Atypical bullous pyoderma gangrenosum associated with myeloid malignancies. J Clin Pathol 1987;40: 387-92. [PMC free article] [PubMed]  10. Charlette E, Winkelmann RK. Superficial granulomatous pyoderma: a localized vegetative form of pyoderma gangrenosum. J Am Acad Dermatol 1988;18: 511-21. [PubMed]  11. Emma WP, Shaheen AL, Cholo HO, Juan KING. Histopathologic and immunopathologic study of pyoderma gangrenosum. J Cutan Pathol 1986; 13: 323-30. [PubMed]  12. Chauncey BLUM. Pyoderma gangrenosum. Lancet 1998;351: 581-5. [PubMed]  13. Figueredo JR, Govind RH, Low-Beer TS, Read AE. Pyoderma gangrenosum and ulcerative colitis. Gut 1980;21: 247-8. [PMC free article] [PubMed]  14. Trenton Vergara P. Pyoderma gangrenosum: a report of 44 cases with follow-up. Br J Dermatol 1997;137: 1000-5. [PubMed]  15. Zahraa ASTORGA, Juan KING. Pyoderma gangrenosum. Clin Dermatol 1993; 11: 129-33. [PubMed]  16. Darlene J, Jaspreet G, Boncassia A, Nany W. Treatment recommendations for pyoderma gangrenosum: an evidence-based review of the literature based on more than 350 patients. J Am Acad Dermatol 2005;53: 273-83. [PubMed]  17. Ifeanyi KAUR, Hermilo DE LA CRUZ, Chauncey BLUM. Clinical features and treatment of peristomal pyoderma gangrenosum. LOTUS 2000;284: 1546-8. [PubMed]  18. Nixon J, Jeremie DU, Zeus CARLSON, Ha DAVENPORT. The successful use of minocycline in pyoderma gangrenosum--a report of seven cases and  review of the literature. J Dermatol Treat 1989;1: 23-5.  19. Nikos RB, Lazarus GS. Pulse therapy. Therapeutic efficacy in the treatment of pyoderma gangrenosum. Arch Dermatol 1982;118: 76-84. [PubMed]  20. Dayna S, Naty JF, Katerine E, Filipe PH, Greg DH. Intravenous cyclosporine in refractory pyoderma gangrenosum complicating inflammatory bowel disease. Inflamm Bowel Dis 2001;7: 1-7. [PubMed]  21. John DIGGS, Perez B, Iftikhar MOSS. Recalcitrant pyoderma gangrenosum treated with systemic tacrolimus. Br J Dermatol 1999;140: 562-4. [PubMed]  22. Kristi M, Jessica J, Mohit S, Alfredo MR, Brooke HENRY. Infliximab for treatment of pyoderma gangrenosum associated with inflammatory bowel disease. Am J Gastroenterol 2003;98: 1821-6. [PubMed]  23. Alex JW, Nikos CA, Britney A. Treatment of pyoderma gangrenosum with etanercept. J Drugs Dermatol 2004;3: 441-4. [PubMed]  24. Joyce TN, Faviola MG, Saurabh A, Ari DOCKERYJ, Froylan DOCKERYD, Iftikhar MOSS, et al. Infliximab for the treatment of pyoderma gangrenosum: a randomised, double blind, placebo controlled trial. Gut 2006;55: 505-9. [PMC free article] [PubMed]  25. Aquiles SB, Mehnaz ESPINAL, Brooke GR, Giuseppe LF, La Nena S, Juan Luis JF, et al. Maintenance infliximab for Crohn's disease: the ACCENT I randomised trial. Lancet 2002;359: 1541-9  26. Jovanni JH, Duarte IK, Jovanni HE, Im M, Claribel YJ, Jovanni JH, Ojvanni Y. Treatment of  Recalcitrant Pyoderma Gangrenosum with Ulcerative Colitis by Adalimumab  Injection. Veronica Dermatol. 2017 Apr;29(2):260-262. doi: 10.5021/ad.2017.29.2.260.  Epub 2017 Mar 24. PubMed PMID: 48537549; PubMed Central PMCID: [id number].  27. Laurence AS, Iván AC, Stephanie ML, Kendy F, Dinora RL. Pyoderma  gangrenosum: clinical characteristics, associated diseases, and responses to  treatment in a retrospective cohort study of 31 patients. Int J Dermatol. 2017  Apr;56(4):386-391. doi: 10.1111/ijd.55352. PubMed PMID: 57797674.  28. Artem FIERRO, Damian MIKE,  Humberto N, El Chelsy R, Gary S, Radha R. Pediatric  pyoderma gangrenosum: a systematic review and update. Int J Dermatol. 2017  May;56(5):486-495. doi: 10.1111/ijd.29720. Epub 2017 Feb 23. Review. PubMed PMID:  21629802.  29. Tea SR, Parag L. Treatment options for pyoderma gangrenosum. J Dtsch  Dermatol Ges. 2017 Jan;15(1):34-40. doi: 10.1111/ddg.67214. Review. PubMed PMID:   90569003.  30. Holly K, Lynsey V, Jeanine S, Magui S, Alyx AS. Biologics for the  treatment of pyoderma gangrenosum in ulcerative colitis. Intest Res. 2016  Oct;14(4):365-368. Epub 2016 Oct 17. PubMed PMID: 15979435; PubMed Central PMCID:  [id number].  31. Jose Eduardo-Genevieve L, Van Merly G, Trino-Shabbir D, Dior-Yusuf L, Adelaida N,  Nino ASTORGA, Pamela S. Systematic Review of Tumor Necrosis Factor Antagonists in  Extraintestinal Manifestations in Inflammatory Bowel Disease. Clin Gastroenterol   Hepatol. 2017 Jan;15(1):25-36.e27. doi: 10.1016/j.cgh.2016.06.025. Epub 2016 Jul 5. Review. PubMed PMID: 39835379.  32. Mizutani Y, Okano T, Delta T, Ohnishi H, Jenniffer O, Saeid A, Seishima M.  Pyoderma Gangrenosum, Acne and Suppurative Hidradenitis Syndrome Treated with  Granulocyte and Monocyte Adsorption Apheresis. Acta Derm Venereol. 2017 Feb 8;97(2):275-276. doi: 10.2340/80031216-0910. PubMed PMID: 85782864.  33. Dave F, Vita D, Kisha M, Luz Marina K, Paul D.  Generalized Pyoderma Gangrenosum Associated with Ulcerative Colitis: Successful  Treatment with Infliximab and Azathioprine. Acta Dermatovenerol Croat. 2016  Apr;24(1):83-5. PubMed PMID: 02041327.  34. Elizabeth Y, Elvin A, José Miguel T, Allegra S. Intestinal Behçet's disease with  pyoderma gangrenosum successfully treated with the combination therapy of  adalimumab and glucocorticoids. Mod Rheumatol. 2016 May 4:1-5. [Epub ahead of  print] PubMed PMID: 58729201.  35.Mariaa S, Lindsey Y, Yissel S, Tori K, Hayden R, Jud K. Successful Use of   Adalimumab for  Treating Pyoderma Gangrenosum with Ulcerative Colitis under  Corticosteroid-tapering Conditions. Intern Med. 2015;54(17):2167-72. doi:  10.2169/internalmedicine.54.4853. Epub 2015 Sep 1. PubMed PMID: 23567336.  36.Nando F, Ariel S, Otto C, Kin Y, Juan Luis J, Farzaneh SP, Sheldon MS,   Oniel E. Effective strategies for the management of pyoderma gangrenosum: a  comprehensive review. Acta Derm Venereol. 2015 May;95(5):525-31. doi:  10.2340/46497441-4760. Review. PubMed PMID: 98873906.

## 2018-01-09 ENCOUNTER — TELEPHONE (OUTPATIENT)
Dept: WOUND CARE | Facility: CLINIC | Age: 43
End: 2018-01-09

## 2018-01-09 ENCOUNTER — OFFICE VISIT (OUTPATIENT)
Dept: DERMATOLOGY | Facility: CLINIC | Age: 43
End: 2018-01-09
Payer: COMMERCIAL

## 2018-01-09 VITALS — DIASTOLIC BLOOD PRESSURE: 79 MMHG | HEART RATE: 89 BPM | OXYGEN SATURATION: 100 % | SYSTOLIC BLOOD PRESSURE: 119 MMHG

## 2018-01-09 DIAGNOSIS — L88 PYODERMA GANGRENOSUM (H): ICD-10-CM

## 2018-01-09 DIAGNOSIS — K50.919 CROHN'S DISEASE WITH COMPLICATION, UNSPECIFIED GASTROINTESTINAL TRACT LOCATION (H): Primary | ICD-10-CM

## 2018-01-09 PROCEDURE — 99213 OFFICE O/P EST LOW 20 MIN: CPT | Performed by: DERMATOLOGY

## 2018-01-09 RX ORDER — OXYCODONE AND ACETAMINOPHEN 7.5; 325 MG/1; MG/1
1 TABLET ORAL EVERY 4 HOURS PRN
Qty: 30 TABLET | Refills: 0 | Status: SHIPPED | OUTPATIENT
Start: 2018-01-09

## 2018-01-09 NOTE — NURSING NOTE
Chief Complaint   Patient presents with     Derm Problem     fu       Vitals:    01/09/18 1301   BP: 119/79   Pulse: 89   SpO2: 100%     Wt Readings from Last 1 Encounters:   01/03/18 55.3 kg (122 lb)     Yue Garcia LPN.................1/9/2018

## 2018-01-09 NOTE — MR AVS SNAPSHOT
After Visit Summary   1/9/2018    Yuni Lam    MRN: [id number]           Patient Information     Date Of Birth          1975        Visit Information        Provider Department      1/9/2018 1:00 PM Alejandro Belle MD [facility]        Today's Diagnoses     Crohn's disease with complication, unspecified gastrointestinal tract location (H)    -  1    Pyoderma gangrenosum           Follow-ups after your visit        Your next 10 appointments already scheduled     Jan 12, 2018 11:15 AM CST   Office Visit with Et Nurse - [facility] ([facility])    [address] [address]   [phone]           Bring a current list of meds and any records pertaining to this visit. For Physicals, please bring immunization records and any forms needing to be filled out. Please arrive 10 minutes early to complete paperwork.              Who to contact     If you have questions or need follow up information about today's clinic visit or your schedule please contact [facility] directly at [phone].  Normal or non-critical lab and imaging results will be communicated to you by [employer], letter or phone within 4 business days after the clinic has received the results. If you do not hear from us within 7 days, please contact the clinic through [employer] or phone. If you have a critical or abnormal lab result, we will notify you by phone as soon as possible.  Submit refill requests through [employer] (P) or call your pharmacy and they will forward the refill request to us. Please allow 3 business days for your refill to be completed.          Additional Information About Your Visit        MyChart Information     [employer] gives you secure access to your electronic health record. If you see a primary care provider, you can also send messages to your care team and make appointments. If you have questions, please call your primary  care clinic.  If you do not have a primary care provider, please call [phone] and they will assist you.        Care EveryWhere ID     This is your Care EveryWhere ID. This could be used by other organizations to access your [place] medical records  [id number]        Your Vitals Were     Pulse Pulse Oximetry                89 100%           Blood Pressure from Last 3 Encounters:   01/09/18 119/79   01/03/18 (!) 119/92   12/12/17 (!) 150/93    Weight from Last 3 Encounters:   01/03/18 55.3 kg (122 lb)   12/04/17 45 kg (99 lb 4.8 oz)   11/10/17 46.7 kg (102 lb 14.4 oz)              Today, you had the following     No orders found for display         Today's Medication Changes          These changes are accurate as of: 1/9/18  1:52 PM.  If you have any questions, ask your nurse or doctor.               These medicines have changed or have updated prescriptions.        Dose/Directions    * oxyCODONE-acetaminophen 7.5-325 MG per tablet   Commonly known as:  PERCOCET   This may have changed:  Another medication with the same name was added. Make sure you understand how and when to take each.   Used for:  Pyoderma gangrenosum, High risk medication use   Changed by:  Alejandro Belle MD        Dose:  1 tablet   Take 1 tablet by mouth every 6 hours as needed for pain maximum 10 tablet(s) per day   Quantity:  30 tablet   Refills:  0       * oxyCODONE-acetaminophen 7.5-325 MG per tablet   Commonly known as:  PERCOCET   This may have changed:  You were already taking a medication with the same name, and this prescription was added. Make sure you understand how and when to take each.   Used for:  Crohn's disease with complication, unspecified gastrointestinal tract location (H), Pyoderma gangrenosum   Changed by:  Alejandro Belle MD        Dose:  1 tablet   Take 1 tablet by mouth every 4 hours as needed for moderate to severe pain maximum 15 tablet(s) per day   Quantity:  30 tablet   Refills:  0       *  Notice:  This list has 2 medication(s) that are the same as other medications prescribed for you. Read the directions carefully, and ask your doctor or other care provider to review them with you.         Where to get your medicines      Some of these will need a paper prescription and others can be bought over the counter.  Ask your nurse if you have questions.     Bring a paper prescription for each of these medications     oxyCODONE-acetaminophen 7.5-325 MG per tablet                Primary Care Provider Office Phone # Fax #    Jonelle Pelayo, APRN [phone] [address] [address]        Equal Access to Services     SALTY AHUMADA : Hadii [place], waaxda luflaquito, qaybta kaalmada marni, maribel huertas . So [facility] [phone].    ATENCIÓN: Si habla español, tiene a stephen disposición servicios gratuitos de asistencia lingüística. Llame al [phone].    We comply with applicable federal civil rights laws and [place] laws. We do not discriminate on the basis of race, color, national origin, age, disability, sex, sexual orientation, or gender identity.            Thank you!     Thank you for choosing [facility]  for your care. Our goal is always to provide you with excellent care. Hearing back from our patients is one way we can continue to improve our services. Please take a few minutes to complete the written survey that you may receive in the mail after your visit with us. Thank you!             Your Updated Medication List - Protect others around you: Learn how to safely use, store and throw away your medicines at www.disposemymeds.org.          This list is accurate as of: 1/9/18  1:52 PM.  Always use your most recent med list.                   Brand Name Dispense Instructions for use Diagnosis    adalimumab 40 MG/0.8ML prefilled syringe kit    HUMIRA    6 kit    80 mg Day 1 and Day 2; 80 mg on Day 15; (Day  29), 40 mg every other week    Pyoderma gangrenosum       augmented betamethasone dipropionate 0.05 % cream    DIPROLENE-AF    100 g    Apply sparingly to affected area twice daily as needed.  Do not apply to face.    Pyoderma gangrenosum       calcium carbonate-vitamin D 600-400 MG-UNIT Chew     180 tablet    Take 1 chew tab by mouth 2 times daily    Pyoderma gangrenosa       cycloSPORINE 100 MG capsule    sandIMMUNE    60 capsule    Take 1 capsule (100 mg) by mouth 2 times daily    Pyoderma gangrenosum       FIBER ADULT GUMMIES 2 G Chew      Take 1 chew tab by mouth daily        FUROSEMIDE PO      Take 20 mg by mouth daily as needed        gabapentin 600 MG tablet    NEURONTIN    180 tablet    Take 2 tablets (1,200 mg) by mouth 3 times daily    Chronic pain syndrome       hydrOXYzine 25 MG tablet    ATARAX    90 tablet    Take 1-2 tablets (25-50 mg) by mouth 3 times daily as needed for anxiety    Anxiety       nicotine 21 MG/24HR 24 hr patch    NICODERM CQ     Place 1 patch onto the skin daily as needed        nicotine polacrilex 2 MG gum    [employer] NICOTINE POLACRILEX    100 each    Place 1 each (2 mg) inside cheek every 2 hours as needed for smoking cessation    Tobacco abuse       * oxyCODONE-acetaminophen 7.5-325 MG per tablet    PERCOCET    30 tablet    Take 1 tablet by mouth every 6 hours as needed for pain maximum 10 tablet(s) per day    Pyoderma gangrenosum, High risk medication use       * oxyCODONE-acetaminophen 7.5-325 MG per tablet    PERCOCET    30 tablet    Take 1 tablet by mouth every 4 hours as needed for moderate to severe pain maximum 15 tablet(s) per day    Crohn's disease with complication, unspecified gastrointestinal tract location (H), Pyoderma gangrenosum       pantoprazole 40 MG EC tablet    PROTONIX    60 tablet    Take 1 tablet twice daily for 2 weeks, then decrease to 40 mg daily morning    Gastroesophageal reflux disease without esophagitis       predniSONE 20 MG tablet    DELTASONE    90  tablet    Take 3 tablets (60 mg) by mouth daily    Pyoderma gangrenosum       propranolol 20 MG tablet    INDERAL    60 tablet    Take 1 tablet (20 mg) by mouth 2 times daily    Shakes       senna-docusate 8.6-50 MG per tablet    SENOKOT-S;PERICOLACE     Take 2 tablets by mouth daily as needed        sertraline 100 MG tablet    ZOLOFT    30 tablet    Take 1 tablet (100 mg) by mouth daily    Anxiety       temazepam 7.5 MG capsule    RESTORIL    30 capsule    Take 1 capsule (7.5 mg) by mouth nightly as needed for sleep    Drug induced insomnia (H)       traMADol 50 MG tablet    ULTRAM    90 tablet    Take 1 tablet (50 mg) by mouth every 6 hours as needed for pain maximum 3-4 tablet(s) per day    Chronic pain syndrome       traZODone 50 MG tablet    DESYREL    90 tablet    Take 3 tablets (150 mg) by mouth At Bedtime    Insomnia, unspecified type       * Notice:  This list has 2 medication(s) that are the same as other medications prescribed for you. Read the directions carefully, and ask your doctor or other care provider to review them with you.

## 2018-01-09 NOTE — TELEPHONE ENCOUNTER
Spoke with pt today as she was in dermatology clinic.  Per pt, pouch applied here yesterday leaked a short time after she was here.  She replaced with new Shana system per previous and this has now been on since about noon yesterday.    Dr. Belle hopeful that Humira will be approved and can begin tomorrow with addition of Crohn's diagnosis.      I did provide pt a flat 2 piece, (so she can use belt,) cut to fit [place] 2 3/4 and 2 1/4 system and a sheet of hydrofera blue with instructions to try this cut to fit stoma, with the addition of the Coloplast Brava protective barrier sheet also cut to fit stoma and a barrier ring to help bolster wound dressing into wound, hydrofera blue in wound, (pt knows how to use).  Pt verbalized understanding.  Scheduled an appointment for follow-up Friday.  She is moving to [place] on Monday, cannot stay here.  She has done some research and thinks there is a place close where she can be seen.  Will need to work on changing over her insurance soon after getting there but current insurance should cover though end of month.      Did contact [facility] and they can ship to new address which is:  [address] [place] [address]      Destini Knott RN, CWOCN

## 2018-01-09 NOTE — LETTER
2018         RE: Yuni Lam  [address] [place] [address]        Dear Colleague,    Thank you for referring your patient, Yuni Lam, to the [facility]. Please see a copy of my visit note below.    Yuni Lam is a 42 year old year old female patient here today for f/u PG.  We couldn't get humira covered. She is on CsA and prednisone.  Last visit pain and odor were much better with some healing, we tapered off of pred and things have gotten worse.  She notes she has gotten on a new HMO.  She would like to see if we can try and get humira covered. We increased CsA.  Humira was denied because not linked to Chrons dx.   She does not want to go back on higher dose steroids.  Associated symptoms: pain is worse. Patient has no other skin complaints today.  Remainder of the HPI, Meds, PMH, Allergies, FH, and SH was reviewed in chart.          Past Medical History            Past Medical History:   Diagnosis Date     Abnormal Papanicolaou smear of cervix and cervical HPV       LEEP     Allergic rhinitis due to pollen        Depressive disorder, not elsewhere classified         Depression (non-psychotic)     Ulcerative colitis                  Past Surgical History              Past Surgical History:   Procedure Laterality Date     C ANESTH, SECTION           C TOTAL ABDOM HYSTERECTOMY            Hysterectomy, Total Abdominal- prolapsed uterus     COLECTOMY          Ulcerative colitis     HC REMOVAL OF OVARY/TUBE(S)            Salpingo-Oophorectomy, Bilateral                Family History                Family History   Problem Relation Age of Onset     Alcohol/Drug Father        Coronary Artery Disease Early Onset Father        CANCER Maternal Grandmother            lung     OSTEOPOROSIS Maternal Grandmother        CEREBROVASCULAR DISEASE Maternal Grandmother        DIABETES Sister            gestational     Emphysema Mother                     Social History    Social History                 Social History     Marital status: Legally          Spouse name: N/A     Number of children: N/A     Years of education: N/A             Occupational History     Not on file.                   Social History Main Topics     Smoking status: Current Every Day Smoker         Packs/day: 0.50         Years: 15.00     Smokeless tobacco: Never Used     Alcohol use 6.0 oz/week       Drug use: No     Sexual activity: Yes         Partners: Male         Birth control/ protection: Surgical               Other Topics Concern     Not on file       Social History Narrative                Encounter Medications                Outpatient Encounter Prescriptions as of 12/12/2017   Medication Sig Dispense Refill     propranolol (INDERAL) 10 MG tablet Take 1 tablet (10 mg) by mouth 2 times daily 60 tablet 1     temazepam (RESTORIL) 7.5 MG capsule Take 1 capsule (7.5 mg) by mouth nightly as needed for sleep 30 capsule 0     nicotine (NICODERM CQ) 21 MG/24HR 24 hr patch Place 1 patch onto the skin daily as needed           FIBER ADULT GUMMIES 2 G CHEW Take 1 chew tab by mouth daily           senna-docusate (SENOKOT-S;PERICOLACE) 8.6-50 MG per tablet Take 2 tablets by mouth daily as needed           ciprofloxacin (CIPRO) 500 MG tablet Take 500 mg by mouth 2 times daily           calcium carbonate-vitamin D 600-400 MG-UNIT CHEW Take 1 chew tab by mouth 2 times daily 180 tablet 3     predniSONE (DELTASONE) 20 MG tablet Take 3 tablets (60 mg) by mouth daily (Patient taking differently: Take 20 mg by mouth 3 times daily ) 90 tablet 3     cycloSPORINE (SANDIMMUNE) 100 MG capsule Take 1 capsule (100 mg) by mouth 2 times daily 60 capsule 3     hydrOXYzine (ATARAX) 25 MG tablet Take 1-2 tablets (25-50 mg) by mouth 3 times daily as needed for anxiety 90 tablet 2     nicotine polacrilex (NICORETTE) 2 MG gum Place 1 each (2 mg) inside cheek every 2 hours as needed for smoking cessation 100  each 1     augmented betamethasone dipropionate (DIPROLENE-AF) 0.05 % cream Apply sparingly to affected area twice daily as needed.  Do not apply to face. 100 g 3     PANTOPRAZOLE SODIUM PO Take 40 mg by mouth daily           FUROSEMIDE PO Take 20 mg by mouth daily as needed            traMADol (ULTRAM) 50 MG tablet Take 1 tablet (50 mg) by mouth every 6 hours as needed for pain maximum 3-4 tablet(s) per day 90 tablet 0     traZODone (DESYREL) 50 MG tablet Take 3 tablets (150 mg) by mouth At Bedtime 90 tablet 2     sertraline (ZOLOFT) 100 MG tablet Take 1 tablet (100 mg) by mouth daily 30 tablet 2     gabapentin (NEURONTIN) 600 MG tablet Take 2 tablets (1,200 mg) by mouth 3 times daily 180 tablet 1       No facility-administered encounter medications on file as of 12/12/2017.                           Review Of Systems  Skin: As above  Eyes: negative  Ears/Nose/Throat: negative  Respiratory: No shortness of breath, dyspnea on exertion, cough, or hemoptysis  Cardiovascular: negative  Gastrointestinal: negative  Genitourinary: negative  Musculoskeletal: negative  Neurologic: negative  Psychiatric: negative  Hematologic/Lymphatic/Immunologic: negative  Endocrine: negative          O:                                   NAD, WDWN, Alert & Oriented, Mood & Affect wnl, Vitals stable                                         Here today alone                                         BP (!) 150/93  Pulse 60  SpO2 100%                                         General appearance normal                                         Vitals stable                                         Alert, oriented and in no acute distress      ostomoy in place, significant expansion of wound with underminging of superior portion of wound, tender             The remainder of expanded problem focused exam was unremarkable; the following areas were examined:  scalp/hair, conjunctiva/lids, face, neck,  lips                                                        Eyes: Conjunctivae/lids:Normal                                                     ENT: Lips, buccal mucosa, tongue: normal                                                    MSK:Normal                                                    Cardiovascular: peripheral edema none                                                    Pulm: Breathing Normal                                                    Neuro/Psych: Orientation:Normal; Mood/Affect:Normal          A/P:  1. PG  I spent one hour one phone and got through [place] ins[place]urance company  PA department states shoulder get approved with Chrons Dx.  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        Sincerely,        Alejandro Belle MD

## 2018-01-09 NOTE — PROGRESS NOTES
Yuni Lam is a 42 year old year old female patient here today for f/u PG.  We couldn't get humira covered. She is on CsA and prednisone.  Last visit pain and odor were much better with some healing, we tapered off of pred and things have gotten worse.  She notes she has gotten on a new HMO.  She would like to see if we can try and get humira covered. We increased CsA.  Humira was denied because not linked to Chrons dx.   She does not want to go back on higher dose steroids.  Associated symptoms: pain is worse. Patient has no other skin complaints today.  Remainder of the HPI, Meds, PMH, Allergies, FH, and SH was reviewed in chart.          Past Medical History            Past Medical History:   Diagnosis Date     Abnormal Papanicolaou smear of cervix and cervical HPV       LEEP     Allergic rhinitis due to pollen        Depressive disorder, not elsewhere classified         Depression (non-psychotic)     Ulcerative colitis                  Past Surgical History              Past Surgical History:   Procedure Laterality Date     C ANESTH, SECTION           C TOTAL ABDOM HYSTERECTOMY            Hysterectomy, Total Abdominal- prolapsed uterus     COLECTOMY          Ulcerative colitis     HC REMOVAL OF OVARY/TUBE(S)            Salpingo-Oophorectomy, Bilateral                Family History                Family History   Problem Relation Age of Onset     Alcohol/Drug Father        Coronary Artery Disease Early Onset Father        CANCER Maternal Grandmother            lung     OSTEOPOROSIS Maternal Grandmother        CEREBROVASCULAR DISEASE Maternal Grandmother        DIABETES Sister            gestational     Emphysema Mother                    Social History    Social History                 Social History     Marital status: Legally          Spouse name: N/A     Number of children: N/A     Years of education: N/A             Occupational History     Not on file.                    Social History Main Topics     Smoking status: Current Every Day Smoker         Packs/day: 0.50         Years: 15.00     Smokeless tobacco: Never Used     Alcohol use 6.0 oz/week       Drug use: No     Sexual activity: Yes         Partners: Male         Birth control/ protection: Surgical               Other Topics Concern     Not on file       Social History Narrative                Encounter Medications                Outpatient Encounter Prescriptions as of 12/12/2017   Medication Sig Dispense Refill     propranolol (INDERAL) 10 MG tablet Take 1 tablet (10 mg) by mouth 2 times daily 60 tablet 1     temazepam (RESTORIL) 7.5 MG capsule Take 1 capsule (7.5 mg) by mouth nightly as needed for sleep 30 capsule 0     nicotine (NICODERM CQ) 21 MG/24HR 24 hr patch Place 1 patch onto the skin daily as needed           FIBER ADULT GUMMIES 2 G CHEW Take 1 chew tab by mouth daily           senna-docusate (SENOKOT-S;PERICOLACE) 8.6-50 MG per tablet Take 2 tablets by mouth daily as needed           ciprofloxacin (CIPRO) 500 MG tablet Take 500 mg by mouth 2 times daily           calcium carbonate-vitamin D 600-400 MG-UNIT CHEW Take 1 chew tab by mouth 2 times daily 180 tablet 3     predniSONE (DELTASONE) 20 MG tablet Take 3 tablets (60 mg) by mouth daily (Patient taking differently: Take 20 mg by mouth 3 times daily ) 90 tablet 3     cycloSPORINE (SANDIMMUNE) 100 MG capsule Take 1 capsule (100 mg) by mouth 2 times daily 60 capsule 3     hydrOXYzine (ATARAX) 25 MG tablet Take 1-2 tablets (25-50 mg) by mouth 3 times daily as needed for anxiety 90 tablet 2     nicotine polacrilex (NICORETTE) 2 MG gum Place 1 each (2 mg) inside cheek every 2 hours as needed for smoking cessation 100 each 1     augmented betamethasone dipropionate (DIPROLENE-AF) 0.05 % cream Apply sparingly to affected area twice daily as needed.  Do not apply to face. 100 g 3     PANTOPRAZOLE SODIUM PO Take 40 mg by mouth daily           FUROSEMIDE PO Take 20 mg  by mouth daily as needed            traMADol (ULTRAM) 50 MG tablet Take 1 tablet (50 mg) by mouth every 6 hours as needed for pain maximum 3-4 tablet(s) per day 90 tablet 0     traZODone (DESYREL) 50 MG tablet Take 3 tablets (150 mg) by mouth At Bedtime 90 tablet 2     sertraline (ZOLOFT) 100 MG tablet Take 1 tablet (100 mg) by mouth daily 30 tablet 2     gabapentin (NEURONTIN) 600 MG tablet Take 2 tablets (1,200 mg) by mouth 3 times daily 180 tablet 1       No facility-administered encounter medications on file as of 12/12/2017.                           Review Of Systems  Skin: As above  Eyes: negative  Ears/Nose/Throat: negative  Respiratory: No shortness of breath, dyspnea on exertion, cough, or hemoptysis  Cardiovascular: negative  Gastrointestinal: negative  Genitourinary: negative  Musculoskeletal: negative  Neurologic: negative  Psychiatric: negative  Hematologic/Lymphatic/Immunologic: negative  Endocrine: negative          O:                                   NAD, WDWN, Alert & Oriented, Mood & Affect wnl, Vitals stable                                         Here today alone                                         BP (!) 150/93  Pulse 60  SpO2 100%                                         General appearance normal                                         Vitals stable                                         Alert, oriented and in no acute distress      ostomoy in place, significant expansion of wound with underminging of superior portion of wound, tender             The remainder of expanded problem focused exam was unremarkable; the following areas were examined:  scalp/hair, conjunctiva/lids, face, neck, lips                                                        Eyes: Conjunctivae/lids:Normal                                                     ENT: Lips, buccal mucosa, tongue:  normal                                                    MSK:Normal                                                    Cardiovascular: peripheral edema none                                                    Pulm: Breathing Normal                                                    Neuro/Psych: Orientation:Normal; Mood/Affect:Normal          A/P:  1. PG  I spent one hour one phone and [place] ins[place]  PA department states shoulder get approved with Chrons Dx.  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

## 2018-01-10 NOTE — TELEPHONE ENCOUNTER
Hui calling from [employer] stating she is going to be reviewing the appeal but needs to know if the pt is being followed by a GI dr for the chrons disease?  This is why the humira was denied initially. Please call hui back [phone]      Zina Paula  Specialty CSS

## 2018-01-10 NOTE — TELEPHONE ENCOUNTER
"Spoke to patient. \"[facility] for Chron's disease.\"    Penny Souza RN         "

## 2018-01-11 NOTE — TELEPHONE ENCOUNTER
Spoke to felicia and informed her of who the pt is seeing for Chrons - waiting appeal response     Zina Paula  Specialty CSS

## 2018-01-12 ENCOUNTER — TELEPHONE (OUTPATIENT)
Dept: WOUND CARE | Facility: CLINIC | Age: 43
End: 2018-01-12

## 2018-01-12 ENCOUNTER — TELEPHONE (OUTPATIENT)
Dept: DERMATOLOGY | Facility: CLINIC | Age: 43
End: 2018-01-12

## 2018-01-12 DIAGNOSIS — L88 PYODERMA GANGRENOSA (H): Primary | ICD-10-CM

## 2018-01-12 NOTE — TELEPHONE ENCOUNTER
Spoke to Hui at  stating PA was approved from GI    We can w/draw our appeal as it would be denied because it requires info from the GI    Will need a letter stating ok to w/draw appeal. Fax to [phone]    pharmacy notified of approval     Zina Bell CSS

## 2018-01-12 NOTE — TELEPHONE ENCOUNTER
Pt states that she is calling regarding her Prior Auth for humira. Pt states that her GI doctor approved it for her and now we are just waiting on this one. Pt would like to know what the status of her appeal is as she is under a time constraint. Please advise.    Arlene Calvo  [facility]

## 2018-01-12 NOTE — TELEPHONE ENCOUNTER
Pt states that she is calling regarding her Prior Auth for humira. Pt states that her GI doctor approved it for her and now we are just waiting on this one. Pt would like to know what the status of her appeal is as she is under a time constraint. Please advise.     Arlene Calvo  [facility] [place]

## 2018-01-12 NOTE — TELEPHONE ENCOUNTER
"Pt called stating she is unable to make scheduled appointment today due to not having a ride.  Still planning on moving on Monday.  Still waiting to hear on Reid, she did talk with her GI doctor who is helping in the process by verifying her Crohn's diagnosis, as lack of this information on file has been reason for denial.  Hoping to be able to start this soon but will come in the mail, her mail will be forwarded to [place].  She has contact information for Humira representative who can send someone to her home for teaching and she is aware that she needs to find someone ASAP to follow-up with.       Pt states, she has not tried the 2 piece flat Cam pouch yet as she is \"having some luck with the old system\" but has used the Hydrofera blue in the wound.  Is requesting more hydrofera blue.  Will place order via [facility].      Did try to find an ostomy nurse for pt in [facility], unable to locate one with in less than 3 [place] drive.  I did reach out to [facility] in [facility] who states will have an ostomy nurse in March and may be able to connect pt with one sooner.  This was relayed to pt.  She states there is another medical center there 10 min from her home that has a \"wound nurse\" so she is going to try there also.        Destini Knott RN, CWOCN   "

## 2018-01-15 NOTE — TELEPHONE ENCOUNTER
Please draft letter withdrawing appeal to [employer] for Humira.  This is a requirement from health [employer]. Thank you  Olesya CHAVEZ RN BSN PHN  Specialty Clinics

## 2018-01-30 ENCOUNTER — NURSE TRIAGE (OUTPATIENT)
Dept: NURSING | Facility: CLINIC | Age: 43
End: 2018-01-30

## 2018-01-30 NOTE — TELEPHONE ENCOUNTER
Additional Information    Negative: [1] Caller is not with the adult (patient) AND [2] reporting urgent symptoms    Negative: Lab result questions    Negative: Medication questions    Negative: Caller cannot be reached by phone    Negative: Caller has already spoken to PCP or another triager    Negative: RN needs further essential information from caller in order to complete triage    Negative: Requesting regular office appointment    Negative: [1] Caller requesting NON-URGENT health information AND [2] PCP's office is the best resource    Negative: Health Information question, no triage required and triager able to answer question    Negative: General information question, no triage required and triager able to answer question    Negative: Question about upcoming scheduled test, no triage required and triager able to answer question    Negative: [1] Caller is not with the adult (patient) AND [2] probable NON-URGENT symptoms    [1] Follow-up call to recent contact AND [2] information only call, no triage required    Protocols used: INFORMATION ONLY CALL-ADULT-JEY Morrissey calls and says that the shot that she was given, for her skin condition, has really helped her. Pt. Says that she is a pt. of Dr. Paredes. Pt. Says that she will call Dr. Belle's clinic tomorrow, to let the  Know the good information.

## 2018-01-31 DIAGNOSIS — G89.4 CHRONIC PAIN SYNDROME: ICD-10-CM

## 2018-01-31 DIAGNOSIS — F19.982 DRUG INDUCED INSOMNIA (H): ICD-10-CM

## 2018-01-31 DIAGNOSIS — F41.9 ANXIETY: ICD-10-CM

## 2018-02-01 ENCOUNTER — TELEPHONE (OUTPATIENT)
Dept: DERMATOLOGY | Facility: CLINIC | Age: 43
End: 2018-02-01

## 2018-02-01 NOTE — TELEPHONE ENCOUNTER
Pt called stating how greatful and appreciative she is of all the hard work of everyone in getting pt Prior Auth for Humira. Pt is especially thankful for the letters that Dr. Belle wrote on pt behalf to the insurance company in order to get Humira approved. Pt states that since beginning Humira she hs been able to keep the bag on for 3 days and that that the area is now completely flat.    Arlene Calvo  [facility]inic [facility] .

## 2018-02-01 NOTE — TELEPHONE ENCOUNTER
"Requested Prescriptions   Pending Prescriptions Disp Refills     gabapentin (NEURONTIN) 600 MG tablet [Pharmacy Med Name: GABAPENTIN 600MG    TAB]  Last Written Prescription Date:  11/10/2017  Last Fill Quantity: 180,  # refills: 1   Last Office Visit with [facility] provider:  12/04/2017   Future Office Visit:      180 tablet 1     Sig: TAKE TWO TABLETS BY MOUTH THREE TIMES DAILY    There is no refill protocol information for this order        temazepam (RESTORIL) 7.5 MG capsule [Pharmacy Med Name: TEMAZEPAM 7.5MG     CAP]  Last Written Prescription Date:  12/18/2017  Last Fill Quantity: 30,  # refills: 0   Last Office Visit with [facility] provider:  12/04/2017   Future Office Visit:      30 capsule 0     Sig: TAKE ONE CAPSULE BY MOUTH ONCE DAILY IN THE EVENING AS NEEDED FOR SLEEP    There is no refill protocol information for this order        hydrOXYzine (ATARAX) 25 MG tablet [Pharmacy Med Name: HYDROXYZINE HCL 25MG TAB]  Last Written Prescription Date:  11/20/2017  Last Fill Quantity: 90,  # refills: 2   Last Office Visit with [facility] provider:  12/04/2017   Future Office Visit:      90 tablet 2     Sig: TAKE ONE TO TWO TABLETS BY MOUTH THREE TIMES DAILY AS NEEDED FOR ANXIETY    Antihistamines Protocol Passed    1/31/2018  8:13 PM       Passed - Patient is 3-64 years of age    Apply weight-based dosing for peds patients age 3 - 12 years of age.    Forward request to provider for patients under the age of 3 or over the age of 64.         Passed - Recent or future visit with authorizing provider's specialty    Patient had office visit in the last year or has a visit in the next 30 days with authorizing provider.  See \"Patient Info\" tab in inbasket, or \"Choose Columns\" in Meds & Orders section of the refill encounter.             Alvin Lombardo RT (R)    "

## 2018-02-05 ENCOUNTER — TELEPHONE (OUTPATIENT)
Dept: FAMILY MEDICINE | Facility: CLINIC | Age: 43
End: 2018-02-05

## 2018-02-05 DIAGNOSIS — G47.00 INSOMNIA, UNSPECIFIED TYPE: ICD-10-CM

## 2018-02-05 RX ORDER — GABAPENTIN 600 MG/1
TABLET ORAL
Qty: 180 TABLET | Refills: 1 | Status: SHIPPED | OUTPATIENT
Start: 2018-02-05

## 2018-02-05 RX ORDER — TEMAZEPAM 7.5 MG/1
CAPSULE ORAL
Qty: 30 CAPSULE | Refills: 0 | Status: SHIPPED | OUTPATIENT
Start: 2018-02-05

## 2018-02-05 RX ORDER — SERTRALINE HYDROCHLORIDE 100 MG/1
100 TABLET, FILM COATED ORAL DAILY
Qty: 30 TABLET | Refills: 2 | Status: SHIPPED | OUTPATIENT
Start: 2018-02-05

## 2018-02-05 RX ORDER — HYDROXYZINE HYDROCHLORIDE 25 MG/1
TABLET, FILM COATED ORAL
Qty: 90 TABLET | Refills: 2 | Status: SHIPPED | OUTPATIENT
Start: 2018-02-05

## 2018-02-05 NOTE — TELEPHONE ENCOUNTER
Pt needs meds as soon as possible. She is also requesting her zoloft.    sertraline (ZOLOFT) 100 MG tablet      Last Written Prescription Date:  11/10/17  Last Fill Quantity: 30,   # refills: 2  Last Office Visit: 12/4/17  Future Office visit:

## 2018-02-05 NOTE — TELEPHONE ENCOUNTER
Routing refill request to provider for review/approval because:  Drug not on the [facility] refill protocol   LOV 12/4/17    Gisella MARK Rn

## 2018-02-07 NOTE — TELEPHONE ENCOUNTER
PA for temazepam completed at cover my med and faxed to  - will await response    Id number [id number]  [phone]

## 2018-02-08 RX ORDER — TRAZODONE HYDROCHLORIDE 50 MG/1
150 TABLET, FILM COATED ORAL AT BEDTIME
Qty: 90 TABLET | Refills: 2 | Status: SHIPPED | OUTPATIENT
Start: 2018-02-08

## 2018-02-08 NOTE — TELEPHONE ENCOUNTER
Spoke with pt and she is aware of PA denial.  Would like to go back to Trazadone 50 mg, 3 tablets at bedtime.  Pt is in need of refill in [place], [place].  Prescription pended for review.    Ama MELARA RN

## 2018-02-08 NOTE — TELEPHONE ENCOUNTER
PA for temazepam has been denied.  Patient would need to have tried and failed zolpidem prior to approval of temazepam 7.5mg.

## 2018-02-08 NOTE — TELEPHONE ENCOUNTER
Please inform patient that Temazepam not covered, PA was denied.  Please ask her if she would like to try Trazodone again. 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    MICAELA Ferris CNP

## 2018-02-26 NOTE — ED AVS SNAPSHOT
[facility] Emergency Department    [address] [address]    Phone:  [phone]    Fax:  [phone]                                       Yuni Lam   MRN: [id number]    Department:  [facility] Emergency Department   Date of Visit:  12/4/2017           Patient Information     Date Of Birth          1975        Your diagnoses for this visit were:     Leukocytosis, unspecified type-steroid-induced     Acute kidney injury (H)-suspect prerenal cause/dehydration        You were seen by Javier Knott DO.        Discharge Instructions       Continue to push oral fluids  Follow-up at the [facility] on Wednesday morning for blood draw to recheck kidney functions.  Call Tok with no your coming in.  Contact your clinic provider on on Wednesday for results.    Future Appointments        Provider Department Dept Phone Center    12/5/2017 9:00 AM Alejandro Belle MD [facility] [phone] [facility]    12/12/2017 2:00 PM Alejandro Belle MD [facility] [phone] [facility]    12/16/2017 10:30 AM [facility] MAMMO ROOM 2 [facility] [phone] [facility]    12/19/2017 1:30 PM ET Nurse -  [facility] Wound Ostomy [phone] [facility]      24 Hour Appointment Hotline       To make an appointment at any [facility], call [phone] ([phone]). If you don't have a family doctor or clinic, we will help you find one. [facility] [place] are conveniently located to serve the needs of you and your family.          ED Discharge Orders     Basic metabolic panel       Follow-up MATIAS.                     Review of your medicines      Our records show that you are taking the medicines listed below. If these are incorrect, please call your family doctor or clinic.        Dose / Directions Last dose taken    augmented betamethasone dipropionate 0.05 % cream   Commonly known as:   DIPROLENE-AF   Quantity:  100 g        Apply sparingly to affected area twice daily as needed.  Do not apply to face.   Refills:  3        calcium carbonate-vitamin D 600-400 MG-UNIT Chew   Dose:  1 chew tab   Quantity:  180 tablet        Take 1 chew tab by mouth 2 times daily   Refills:  3        ciprofloxacin 500 MG tablet   Commonly known as:  CIPRO   Dose:  500 mg        Take 500 mg by mouth 2 times daily   Refills:  0        cycloSPORINE 100 MG capsule   Commonly known as:  sandIMMUNE   Dose:  100 mg   Quantity:  60 capsule        Take 1 capsule (100 mg) by mouth 2 times daily   Refills:  3        FIBER ADULT GUMMIES 2 G Chew   Dose:  1 chew tab        Take 1 chew tab by mouth daily   Refills:  0        FUROSEMIDE PO   Dose:  20 mg        Take 20 mg by mouth daily as needed   Refills:  0        gabapentin 600 MG tablet   Commonly known as:  NEURONTIN   Dose:  1200 mg   Quantity:  180 tablet        Take 2 tablets (1,200 mg) by mouth 3 times daily   Refills:  1        hydrOXYzine 25 MG tablet   Commonly known as:  ATARAX   Dose:  25-50 mg   Quantity:  90 tablet        Take 1-2 tablets (25-50 mg) by mouth 3 times daily as needed for anxiety   Refills:  2        nicotine 21 MG/24HR 24 hr patch   Commonly known as:  NICODERM CQ   Dose:  1 patch        Place 1 patch onto the skin daily as needed   Refills:  0        nicotine polacrilex 2 MG gum   Commonly known as:  [employer] NICOTINE POLACRILEX   Dose:  2 mg   Quantity:  100 each        Place 1 each (2 mg) inside cheek every 2 hours as needed for smoking cessation   Refills:  1        PANTOPRAZOLE SODIUM PO   Dose:  40 mg        Take 40 mg by mouth daily   Refills:  0        predniSONE 20 MG tablet   Commonly known as:  DELTASONE   Dose:  60 mg   Quantity:  90 tablet        Take 3 tablets (60 mg) by mouth daily   Refills:  3        propranolol 10 MG tablet   Commonly known as:  INDERAL   Dose:  10 mg   Quantity:  60 tablet        Take 1 tablet (10 mg) by mouth 2 times  daily   Refills:  1        senna-docusate 8.6-50 MG per tablet   Commonly known as:  SENOKOT-S;PERICOLACE   Dose:  2 tablet        Take 2 tablets by mouth daily as needed   Refills:  0        sertraline 100 MG tablet   Commonly known as:  ZOLOFT   Dose:  100 mg   Quantity:  30 tablet        Take 1 tablet (100 mg) by mouth daily   Refills:  2        temazepam 7.5 MG capsule   Commonly known as:  RESTORIL   Dose:  7.5 mg   Quantity:  30 capsule        Take 1 capsule (7.5 mg) by mouth nightly as needed for sleep   Refills:  0        traMADol 50 MG tablet   Commonly known as:  ULTRAM   Dose:  50 mg   Quantity:  90 tablet        Take 1 tablet (50 mg) by mouth every 6 hours as needed for pain maximum 3-4 tablet(s) per day   Refills:  0        traZODone 50 MG tablet   Commonly known as:  DESYREL   Dose:  150 mg   Quantity:  90 tablet        Take 3 tablets (150 mg) by mouth At Bedtime   Refills:  2                Procedures and tests performed during your visit     CBC with platelets differential    Comprehensive metabolic panel      Orders Needing Specimen Collection     None      Pending Results     No orders found from 12/2/2017 to 12/5/2017.            Pending Culture Results     No orders found from 12/2/2017 to 12/5/2017.            Pending Results Instructions     If you had any lab results that were not finalized at the time of your Discharge, you can call the ED Lab Result RN at [phone]. You will be contacted by this team for any positive Lab results or changes in treatment. The nurses are available 7 days a week from 10A to 6:30P.  You can leave a message 24 hours per day and they will return your call.        Test Results From Your Hospital Stay        12/4/2017  8:14 PM      Component Results     Component Value Ref Range & Units Status    WBC 16.2 (H) 4.0 - 11.0 10e9/L Final    RBC Count 5.18 3.8 - 5.2 10e12/L Final    Hemoglobin 13.6 11.7 - 15.7 g/dL Final    Hematocrit 40.9 35.0 - 47.0 % Final    MCV 79  78 - 100 fl Final    MCH 26.3 (L) 26.5 - 33.0 pg Final    MCHC 33.3 31.5 - 36.5 g/dL Final    RDW 15.4 (H) 10.0 - 15.0 % Final    Platelet Count 235 150 - 450 10e9/L Final    Diff Method Automated Method  Final    % Neutrophils 82.3 % Final    % Lymphocytes 13.5 % Final    % Monocytes 3.8 % Final    % Eosinophils 0.0 % Final    % Basophils 0.0 % Final    % Immature Granulocytes 0.4 % Final    Absolute Neutrophil 13.3 (H) 1.6 - 8.3 10e9/L Final    Absolute Lymphocytes 2.2 0.8 - 5.3 10e9/L Final    Absolute Monocytes 0.6 0.0 - 1.3 10e9/L Final    Absolute Eosinophils 0.0 0.0 - 0.7 10e9/L Final    Absolute Basophils 0.0 0.0 - 0.2 10e9/L Final    Abs Immature Granulocytes 0.1 0 - 0.4 10e9/L Final         12/4/2017  8:40 PM      Component Results     Component Value Ref Range & Units Status    Sodium 134 133 - 144 mmol/L Final    Potassium 4.4 3.4 - 5.3 mmol/L Final    Chloride 105 94 - 109 mmol/L Final    Carbon Dioxide 17 (L) 20 - 32 mmol/L Final    Anion Gap 12 3 - 14 mmol/L Final    Glucose 131 (H) 70 - 99 mg/dL Final    Urea Nitrogen 59 (H) 7 - 30 mg/dL Final    Creatinine 2.57 (H) 0.52 - 1.04 mg/dL Final    GFR Estimate 20 (L) >60 mL/min/1.7m2 Final    Non  GFR Calc    GFR Estimate If Black 24 (L) >60 mL/min/1.7m2 Final    African American GFR Calc    Calcium 8.7 8.5 - 10.1 mg/dL Final    Bilirubin Total 0.4 0.2 - 1.3 mg/dL Final    Albumin 3.9 3.4 - 5.0 g/dL Final    Protein Total 8.7 6.8 - 8.8 g/dL Final    Alkaline Phosphatase 83 40 - 150 U/L Final    ALT 25 0 - 50 U/L Final    AST 21 0 - 45 U/L Final                Thank you for choosing [facility]       Thank you for choosing [facility] for your care. Our goal is always to provide you with excellent care. Hearing back from our patients is one way we can continue to improve our services. Please take a few minutes to complete the written survey that you may receive in the mail after you visit with us. Thank you!        MyChart Information     [facility]  gives you secure access to your electronic health record. If you see a primary care provider, you can also send messages to your care team and make appointments. If you have questions, please call your primary care clinic.  If you do not have a primary care provider, please call [phone] and they will assist you.        Care EveryWhere ID     This is your Care EveryWhere ID. This could be used by other organizations to access your [place] medical records  [id number]        Equal Access to Services     SALTY AHUMADA : Hadii suleman deeo Sofrancoise, waaxda luisamaradaha, qaybta kaalmapooja grider, maribel ngo. So [facility] [phone].    ATENCIÓN: Si habla español, tiene a stephen disposición servicios gratuitos de asistencia lingüística. Lidiaame al [phone].    We comply with applicable federal civil rights laws and [place] laws. We do not discriminate on the basis of race, color, national origin, age, disability, sex, sexual orientation, or gender identity.            After Visit Summary       This is your record. Keep this with you and show to your community pharmacist(s) and doctor(s) at your next visit.                   no

## 2018-03-02 ENCOUNTER — TELEPHONE (OUTPATIENT)
Dept: DERMATOLOGY | Facility: CLINIC | Age: 43
End: 2018-03-02

## 2018-03-02 NOTE — TELEPHONE ENCOUNTER
I received a message from [facility]. They wanted to confirm if Dr Belle really wanted Yuni to be on the Humira starter kit that was sent on January 3rd 2018. They were thinking that she should be on a maintenance dose instead of a starter kit. I let her know that I would route a message to him. Brigid CARBALLO RN

## 2018-03-02 NOTE — TELEPHONE ENCOUNTER
Called pharmacy and notified that patients GI physician is the one ordering the Humira and who the PA was approved through. Specialty pharmacy stated they would get in touch with the patient to request refill from GI doctor.  Olesya CHAVEZ RN BSN PHN  Specialty Clinics

## 2019-09-28 ENCOUNTER — HEALTH MAINTENANCE LETTER (OUTPATIENT)
Age: 44
End: 2019-09-28

## 2021-01-10 ENCOUNTER — HEALTH MAINTENANCE LETTER (OUTPATIENT)
Age: 46
End: 2021-01-10

## 2021-03-13 ENCOUNTER — HEALTH MAINTENANCE LETTER (OUTPATIENT)
Age: 46
End: 2021-03-13

## 2021-10-23 ENCOUNTER — HEALTH MAINTENANCE LETTER (OUTPATIENT)
Age: 46
End: 2021-10-23

## 2022-02-12 ENCOUNTER — HEALTH MAINTENANCE LETTER (OUTPATIENT)
Age: 47
End: 2022-02-12

## 2022-04-09 ENCOUNTER — HEALTH MAINTENANCE LETTER (OUTPATIENT)
Age: 47
End: 2022-04-09

## 2022-10-09 ENCOUNTER — HEALTH MAINTENANCE LETTER (OUTPATIENT)
Age: 47
End: 2022-10-09

## 2023-02-18 ENCOUNTER — HEALTH MAINTENANCE LETTER (OUTPATIENT)
Age: 48
End: 2023-02-18

## 2023-05-21 ENCOUNTER — HEALTH MAINTENANCE LETTER (OUTPATIENT)
Age: 48
End: 2023-05-21

## 2024-03-16 ENCOUNTER — HEALTH MAINTENANCE LETTER (OUTPATIENT)
Age: 49
End: 2024-03-16